# Patient Record
Sex: MALE | Race: WHITE | NOT HISPANIC OR LATINO | URBAN - METROPOLITAN AREA
[De-identification: names, ages, dates, MRNs, and addresses within clinical notes are randomized per-mention and may not be internally consistent; named-entity substitution may affect disease eponyms.]

---

## 2017-12-21 ENCOUNTER — EMERGENCY (EMERGENCY)
Facility: HOSPITAL | Age: 52
LOS: 1 days | Discharge: HOME | End: 2017-12-21
Attending: EMERGENCY MEDICINE | Admitting: EMERGENCY MEDICINE
Payer: MEDICAID

## 2017-12-21 VITALS
RESPIRATION RATE: 15 BRPM | DIASTOLIC BLOOD PRESSURE: 116 MMHG | OXYGEN SATURATION: 100 % | SYSTOLIC BLOOD PRESSURE: 172 MMHG | HEART RATE: 71 BPM

## 2017-12-21 VITALS
HEART RATE: 70 BPM | DIASTOLIC BLOOD PRESSURE: 95 MMHG | TEMPERATURE: 98 F | SYSTOLIC BLOOD PRESSURE: 143 MMHG | RESPIRATION RATE: 16 BRPM | OXYGEN SATURATION: 100 %

## 2017-12-21 PROCEDURE — 99284 EMERGENCY DEPT VISIT MOD MDM: CPT | Mod: 25

## 2017-12-21 PROCEDURE — 93010 ELECTROCARDIOGRAM REPORT: CPT

## 2017-12-21 RX ORDER — LISINOPRIL 2.5 MG/1
1 TABLET ORAL
Qty: 14 | Refills: 0 | OUTPATIENT
Start: 2017-12-21 | End: 2018-01-03

## 2017-12-21 RX ORDER — LISINOPRIL 2.5 MG/1
5 TABLET ORAL DAILY
Qty: 0 | Refills: 0 | Status: DISCONTINUED | OUTPATIENT
Start: 2017-12-21 | End: 2017-12-25

## 2017-12-21 RX ADMIN — LISINOPRIL 5 MILLIGRAM(S): 2.5 TABLET ORAL at 22:02

## 2017-12-21 NOTE — ED ADULT NURSE NOTE - OBJECTIVE STATEMENT
pt received to room 7 pt is A&0x3 pt comes to ED for HTN. pt went to [facility] today and checked his BP and it was 200. pt became nervous and decided to come to the ER. pt states he been stressed lately and recently been started on BP meds. pt VS stable otherwise pt denies CP SOB NVD. meds ordered and given. As per Md no bloodwork needed at this time. awaiting further orders Will continue to monitor the pt

## 2017-12-21 NOTE — ED ADULT TRIAGE NOTE - CHIEF COMPLAINT QUOTE
Pt reports he went to [facility] and his SBP was 200.  Pt states was given a medication by his doctor but it gave him a headache and made his mouth dry; pt was told to stop medication.  Pt denies pain.

## 2017-12-21 NOTE — ED PROVIDER NOTE - NEUROLOGICAL, MLM
Alert and oriented, no focal deficits, no motor or sensory deficits. Gait steady. CN II-XII intact. Motor and strength intact in BUE and BLE.

## 2017-12-21 NOTE — ED PROVIDER NOTE - OBJECTIVE STATEMENT
52M PMH recently diagnosed HTN presents with systolic 200s taken in [facility]. Patient was feeling very stressed at the end of his work day (he works in the meat and restaurant business) and felt his face grow hot and red so he went to [facility] to check his BP. When they saw systolic 200s, they sent him to the ED. He denies any current headaches, visual changes, blurred vision, dizziness, lightheadedness, chest pain, shortness of breath, palpitations. When he was dx with HTN he was put on a low dose HCTZ. When he went back to the PMD, there weren't many changes to the BP so he switched him to another medication that was giving him early morning HAs and dry mouth. He does not know the name. Pt was supposed to f/u with cardiology but has not had time to go. Patient occasionally smokes cigarettes. Patient denies any complaints at the moment.

## 2017-12-21 NOTE — ED PROVIDER NOTE - MEDICAL DECISION MAKING DETAILS
52M PMH HTN presents with uncontrolled HTN. Not concerned for ACS at this time. Will administer lisinopril and check EKG; send pt home with Lisinopril. F/u with PMD and cardiologist.

## 2017-12-21 NOTE — ED PROVIDER NOTE - PLAN OF CARE
You were seen today for high blood pressure. Please take the medication we sent to your pharmacy and try to see your primary care doctor in the next 3 days. it is also important that you see your cardiologist for that appointment you made to check how your heart is doing.  Please try to take it easy even in the busy season.  If you start to experience chest pain, especially when exerting yourself, shortness of breath, blurry vision, or for any other emergent concern please come back to the emergency room.

## 2017-12-21 NOTE — ED ADULT NURSE NOTE - CHIEF COMPLAINT QUOTE
Pt reports [facility] and his SBP was 200.  Pt states was given a medication by his doctor but it gave him a headache and made his mouth dry; pt was told to stop medication.  Pt denies pain.

## 2017-12-21 NOTE — ED PROVIDER NOTE - ATTENDING CONTRIBUTION TO CARE
I agree with the above H&P.  Briefly this is a 62 presenting with asymptomatic hypertension.  check bp today after feeling stressed and it was elevated.  cam to ED, now in 170's.  will check EKG, give lisinopril and dc to PMD

## 2017-12-21 NOTE — ED PROVIDER NOTE - PROGRESS NOTE DETAILS
Ismael: Patient feeling well. Repeat /105. Will d/c to f/u with PMD with 2 week worth of lisinopril.

## 2017-12-21 NOTE — ED PROVIDER NOTE - CARE PLAN
Principal Discharge DX:	Hypertension, unspecified type  Instructions for follow-up, activity and diet:	You were seen today for high blood pressure. Please take the medication we sent to your pharmacy and try to see your primary care doctor in the next 3 days. it is also important that you see your cardiologist for that appointment you made to check how your heart is doing.  Please try to take it easy even in the busy season.  If you start to experience chest pain, especially when exerting yourself, shortness of breath, blurry vision, or for any other emergent concern please come back to the emergency room.

## 2017-12-21 NOTE — ED PROVIDER NOTE - CONSTITUTIONAL, MLM
normal... Well appearing, well nourished, awake, alert, oriented to person, place, time/situation. Patient appeas slightly anxious.

## 2023-04-26 ENCOUNTER — EMERGENCY (EMERGENCY)
Facility: HOSPITAL | Age: 58
LOS: 1 days | Discharge: HOME | End: 2023-04-26
Attending: STUDENT IN AN ORGANIZED HEALTH CARE EDUCATION/TRAINING PROGRAM | Admitting: STUDENT IN AN ORGANIZED HEALTH CARE EDUCATION/TRAINING PROGRAM
Payer: MEDICAID

## 2023-04-26 VITALS
SYSTOLIC BLOOD PRESSURE: 162 MMHG | HEART RATE: 66 BPM | DIASTOLIC BLOOD PRESSURE: 99 MMHG | TEMPERATURE: 98 F | OXYGEN SATURATION: 100 % | RESPIRATION RATE: 20 BRPM

## 2023-04-26 VITALS
DIASTOLIC BLOOD PRESSURE: 114 MMHG | HEART RATE: 65 BPM | RESPIRATION RATE: 17 BRPM | OXYGEN SATURATION: 100 % | TEMPERATURE: 98 F | SYSTOLIC BLOOD PRESSURE: 164 MMHG

## 2023-04-26 PROCEDURE — 99284 EMERGENCY DEPT VISIT MOD MDM: CPT

## 2023-04-26 PROCEDURE — 93010 ELECTROCARDIOGRAM REPORT: CPT

## 2023-04-26 NOTE — ED PROVIDER NOTE - CLINICAL SUMMARY MEDICAL DECISION MAKING FREE TEXT BOX
Venancio PGY2: 59yo M with PMH of HTN presents to ED for eval of elevated BP reads at home after restarting BP meds today. Concern with mild HA already resolving, no other assoc htn sxs. No changes to calf, Wells 0. Likely MSK strain, ambulated into ED. EKG NSR w/o acute ischemic changes. Likely known HTN. Has PCP, likely dc to f/u.

## 2023-04-26 NOTE — ED PROVIDER NOTE - PHYSICAL EXAMINATION
CONSTITUTIONAL: well appearing, comfortable  SKIN: Warm dry  HEAD: NCAT  CARD: RRR  RESP: CTAB  ABD: S/NT no R/G  EXT: no pedal edema, no calf asymmetry/redness/warmth or tenderness  NEURO: Grossly non-focal, no weakness/numbness/tingling  PSYCH: Cooperative, appropriate.

## 2023-04-26 NOTE — ED PROVIDER NOTE - OBJECTIVE STATEMENT
57yo M with PMH of HTN presents to ED for eval of elevated BP reads at home. Patient was previously on amlodipine 5mg but stopped over last mo bc he felt better and BP was better. However recently bp reads again high so went to PCP today who restarted his amlodipine and did full set of blood work. After returning home he re-checked his BP and it was 150s/100s so became anxious and came to ED. He had a mild CHAPA at time which also concerned him but he admits it has also improved to ~3/10. He does mention some R calf pain 3-4d which had steadily improved after some lifting some heavy cylinders at work. No hx of blood clots, smoking, hormones, or travel. Denies CP, SOB, abd pain, NVDC, fever or neuro complaints.

## 2023-04-26 NOTE — ED PROVIDER NOTE - ATTENDING CONTRIBUTION TO CARE
Dr. Culver, Attending Physician-  I performed a face to face bedside interview with patient regarding history of present illness, review of symptoms and past medical history. I completed an independent physical exam.  I have discussed patient's plan of care with the resident.    58M, HTN, who presents with elevated BP. Has been off his amlodipine 5mg for the past one month. Went to PCP recently and was noted again to have a high BP. Otherwise asymptomatic. On ROS, denies headaches, fevers, chills, cough, sputum, cp, sob, abdominal pain, nvd, dysuria, hematuria, recent travel, trauma, syncope, black/bloody stools. Has a headache from time to time but here in the ED without acute sxs. Physical: afebrile, well appearing, neck supple, rrr, ctabl, abdomen soft, no le edema, stable gait, dia spontaneously. Plan: DC with PMD followup.

## 2023-04-26 NOTE — ED ADULT NURSE NOTE - OBJECTIVE STATEMENT
Patient received in spot 10A. Patient A&Ox4 and ambulatory at baseline. Patient presents to the ED c/o high blood pressure at home. Patient PMH HTN and is non-compliant with medication, did not provided reason. Patient states he checked his BP at home and it was high, patient adds he has been having calf pain recently too and he was concerned so he came to the ED for evaluation. Patient denies lightheadedness, blurred vision, and headache. Airway patent, chest rise equal bilaterally, lung sounds clear and equal bilaterally, respirations unlabored. Patient denies dyspnea, shortness of breath, and chest pain. Abdomen flat, soft and non-distended; patient denies nausea/vomiting and changes in BMs/urine. Pulse/motor/sensory present and no edema noted to all four extremities. Steady gait observed, safety measures in place.

## 2023-04-26 NOTE — ED PROVIDER NOTE - IV ALTEPLASE EXCL ABS HIDDEN
Reason for Call:  Other call back    Detailed comments: Melody called to say she was to give the office a phone report as to how she is doing with pain management.    Phone Number Patient can be reached at: Cell number on file:    Telephone Information:   Mobile [phone]       Best Time: any    Can we leave a detailed message on this number? YES    Call taken on 4/13/2020 at 11:49 AM by Mary Rodriguez     show

## 2023-04-26 NOTE — ED PROVIDER NOTE - NSFOLLOWUPINSTRUCTIONS_ED_ALL_ED_FT
Hypertension    Hypertension, commonly called high blood pressure, is when the force of blood pumping through your arteries is too strong. Hypertension forces your heart to work harder to pump blood. Your arteries may become narrow or stiff. Having untreated or uncontrolled hypertension for a long period of time can cause heart attack, stroke, kidney disease, and other problems. If started on a medication, take exactly as prescribed by your health care professional.     Maintain a healthy lifestyle and follow up with your primary care physician on discharge for follow-up and medication adjustment as appropriate.     SEEK IMMEDIATE MEDICAL CARE IF YOU HAVE ANY OF THE FOLLOWING SYMPTOMS: severe headache, confusion, chest pain, abdominal pain, vomiting, or shortness of breath.

## 2023-04-26 NOTE — ED PROVIDER NOTE - PATIENT PORTAL LINK FT
You can access the FollowMyHealth Patient Portal offered by [facility] by registering at the following website: [email or web address]. By joining ’s FollowMyHealth portal, you will also be able to view your health information using other applications (apps) compatible with our system.

## 2023-04-26 NOTE — ED ADULT TRIAGE NOTE - CHIEF COMPLAINT QUOTE
Pt c/o high blood pressure with associated mild headache and right sided calf pain with mild swelling. Pt bp reading at home 159/102. Pt endorsing taking bp meds as prescribed. Denies cp

## 2025-05-03 ENCOUNTER — EMERGENCY (EMERGENCY)
Facility: HOSPITAL | Age: 60
LOS: 1 days | End: 2025-05-03
Attending: STUDENT IN AN ORGANIZED HEALTH CARE EDUCATION/TRAINING PROGRAM | Admitting: STUDENT IN AN ORGANIZED HEALTH CARE EDUCATION/TRAINING PROGRAM
Payer: MEDICAID

## 2025-05-03 VITALS
HEIGHT: 68 IN | WEIGHT: 179.9 LBS | SYSTOLIC BLOOD PRESSURE: 147 MMHG | HEART RATE: 110 BPM | DIASTOLIC BLOOD PRESSURE: 90 MMHG | OXYGEN SATURATION: 95 % | TEMPERATURE: 100 F | RESPIRATION RATE: 16 BRPM

## 2025-05-03 PROCEDURE — 99284 EMERGENCY DEPT VISIT MOD MDM: CPT | Mod: 25

## 2025-05-03 NOTE — ED ADULT TRIAGE NOTE - CCCP TRG CHIEF CMPLNT
June 14, 2018      James Smiley MD  [address]  [facility] [address]           [facility]  [id number] [place] [id number]  Phone: [phone]  Fax: [phone]          Patient: Anu Blanco   MR Number: [id number]   YOB: 1938   Date of Visit: 6/12/2018       Dear Dr. James Smiley:    Thank you for referring Anu Blanco to me for evaluation. Attached you will find relevant portions of my assessment and plan of care.    If you have questions, please do not hesitate to call me. I look forward to following Anu Blanco along with you.    Sincerely,    Rohini Sherman NP    Enclosure  CC:  No Recipients    If you would like to receive this communication electronically, please contact [email or web address] or [phone] to request more information on [facility] access.    For providers and/or their staff who would like to refer a patient to [facility], please contact us through our one-stop-shop provider referral line, [facility], at [phone].    If you feel you have received this communication in error or would no longer like to receive these types of communications, please e-mail [email or web address]          decreased PO intake/nausea

## 2025-05-03 NOTE — ED ADULT TRIAGE NOTE - CHIEF COMPLAINT QUOTE
c/o generalized weakness, decreased po intake since yesterday. endorses chills and nausea, no vomiting. denies abd pain, falls/trauma, gu sx, cp, sob. past medical hx HTN.

## 2025-05-04 VITALS
SYSTOLIC BLOOD PRESSURE: 105 MMHG | RESPIRATION RATE: 18 BRPM | OXYGEN SATURATION: 98 % | TEMPERATURE: 100 F | DIASTOLIC BLOOD PRESSURE: 61 MMHG | HEART RATE: 81 BPM

## 2025-05-04 PROBLEM — I10 ESSENTIAL (PRIMARY) HYPERTENSION: Chronic | Status: ACTIVE | Noted: 2023-04-26

## 2025-05-04 LAB
ADD ON TEST-SPECIMEN IN LAB: SIGNIFICANT CHANGE UP
ALBUMIN SERPL ELPH-MCNC: 4.4 G/DL — SIGNIFICANT CHANGE UP (ref 3.3–5)
ALP SERPL-CCNC: 62 U/L — SIGNIFICANT CHANGE UP (ref 40–120)
ALT FLD-CCNC: 26 U/L — SIGNIFICANT CHANGE UP (ref 4–41)
ANION GAP SERPL CALC-SCNC: 14 MMOL/L — SIGNIFICANT CHANGE UP (ref 7–14)
APPEARANCE UR: CLEAR — SIGNIFICANT CHANGE UP
AST SERPL-CCNC: 24 U/L — SIGNIFICANT CHANGE UP (ref 4–40)
BACTERIA # UR AUTO: NEGATIVE /HPF — SIGNIFICANT CHANGE UP
BASOPHILS # BLD AUTO: 0 K/UL — SIGNIFICANT CHANGE UP (ref 0–0.2)
BASOPHILS NFR BLD AUTO: 0 % — SIGNIFICANT CHANGE UP (ref 0–2)
BILIRUB SERPL-MCNC: 1.6 MG/DL — HIGH (ref 0.2–1.2)
BILIRUB UR-MCNC: NEGATIVE — SIGNIFICANT CHANGE UP
BUN SERPL-MCNC: 11 MG/DL — SIGNIFICANT CHANGE UP (ref 7–23)
CALCIUM SERPL-MCNC: 8.9 MG/DL — SIGNIFICANT CHANGE UP (ref 8.4–10.5)
CAST: 0 /LPF — SIGNIFICANT CHANGE UP (ref 0–4)
CHLORIDE SERPL-SCNC: 98 MMOL/L — SIGNIFICANT CHANGE UP (ref 98–107)
CO2 SERPL-SCNC: 22 MMOL/L — SIGNIFICANT CHANGE UP (ref 22–31)
COLOR SPEC: YELLOW — SIGNIFICANT CHANGE UP
CREAT SERPL-MCNC: 1 MG/DL — SIGNIFICANT CHANGE UP (ref 0.5–1.3)
DIFF PNL FLD: NEGATIVE — SIGNIFICANT CHANGE UP
EGFR: 86 ML/MIN/1.73M2 — SIGNIFICANT CHANGE UP
EGFR: 86 ML/MIN/1.73M2 — SIGNIFICANT CHANGE UP
EOSINOPHIL # BLD AUTO: 0 K/UL — SIGNIFICANT CHANGE UP (ref 0–0.5)
EOSINOPHIL NFR BLD AUTO: 0 % — SIGNIFICANT CHANGE UP (ref 0–6)
FLUAV AG NPH QL: SIGNIFICANT CHANGE UP
FLUBV AG NPH QL: SIGNIFICANT CHANGE UP
GAS PNL BLDV: SIGNIFICANT CHANGE UP
GLUCOSE SERPL-MCNC: 112 MG/DL — HIGH (ref 70–99)
GLUCOSE UR QL: NEGATIVE MG/DL — SIGNIFICANT CHANGE UP
HCT VFR BLD CALC: 45.8 % — SIGNIFICANT CHANGE UP (ref 39–50)
HGB BLD-MCNC: 16.1 G/DL — SIGNIFICANT CHANGE UP (ref 13–17)
IANC: 12.77 K/UL — HIGH (ref 1.8–7.4)
KETONES UR-MCNC: NEGATIVE MG/DL — SIGNIFICANT CHANGE UP
LEUKOCYTE ESTERASE UR-ACNC: NEGATIVE — SIGNIFICANT CHANGE UP
LYMPHOCYTES # BLD AUTO: 0.4 K/UL — LOW (ref 1–3.3)
LYMPHOCYTES # BLD AUTO: 2.8 % — LOW (ref 13–44)
MAGNESIUM SERPL-MCNC: 1.6 MG/DL — SIGNIFICANT CHANGE UP (ref 1.6–2.6)
MCHC RBC-ENTMCNC: 30.2 PG — SIGNIFICANT CHANGE UP (ref 27–34)
MCHC RBC-ENTMCNC: 35.2 G/DL — SIGNIFICANT CHANGE UP (ref 32–36)
MCV RBC AUTO: 85.9 FL — SIGNIFICANT CHANGE UP (ref 80–100)
MONOCYTES # BLD AUTO: 0.27 K/UL — SIGNIFICANT CHANGE UP (ref 0–0.9)
MONOCYTES NFR BLD AUTO: 1.9 % — LOW (ref 2–14)
NEUTROPHILS # BLD AUTO: 13.07 K/UL — HIGH (ref 1.8–7.4)
NEUTROPHILS NFR BLD AUTO: 91.6 % — HIGH (ref 43–77)
NITRITE UR-MCNC: NEGATIVE — SIGNIFICANT CHANGE UP
PH UR: 6 — SIGNIFICANT CHANGE UP (ref 5–8)
PLATELET # BLD AUTO: 225 K/UL — SIGNIFICANT CHANGE UP (ref 150–400)
POTASSIUM SERPL-MCNC: 3.6 MMOL/L — SIGNIFICANT CHANGE UP (ref 3.5–5.3)
POTASSIUM SERPL-SCNC: 3.6 MMOL/L — SIGNIFICANT CHANGE UP (ref 3.5–5.3)
PROT SERPL-MCNC: 6.8 G/DL — SIGNIFICANT CHANGE UP (ref 6–8.3)
PROT UR-MCNC: 100 MG/DL
RBC # BLD: 5.33 M/UL — SIGNIFICANT CHANGE UP (ref 4.2–5.8)
RBC # FLD: 12.7 % — SIGNIFICANT CHANGE UP (ref 10.3–14.5)
RBC CASTS # UR COMP ASSIST: 0 /HPF — SIGNIFICANT CHANGE UP (ref 0–4)
RSV RNA NPH QL NAA+NON-PROBE: SIGNIFICANT CHANGE UP
SARS-COV-2 RNA SPEC QL NAA+PROBE: SIGNIFICANT CHANGE UP
SODIUM SERPL-SCNC: 134 MMOL/L — LOW (ref 135–145)
SOURCE RESPIRATORY: SIGNIFICANT CHANGE UP
SP GR SPEC: 1.02 — SIGNIFICANT CHANGE UP (ref 1–1.03)
SQUAMOUS # UR AUTO: 1 /HPF — SIGNIFICANT CHANGE UP (ref 0–5)
UROBILINOGEN FLD QL: 0.2 MG/DL — SIGNIFICANT CHANGE UP (ref 0.2–1)
WBC # BLD: 14.27 K/UL — HIGH (ref 3.8–10.5)
WBC # FLD AUTO: 14.27 K/UL — HIGH (ref 3.8–10.5)
WBC UR QL: 1 /HPF — SIGNIFICANT CHANGE UP (ref 0–5)

## 2025-05-04 PROCEDURE — 70450 CT HEAD/BRAIN W/O DYE: CPT | Mod: 26

## 2025-05-04 PROCEDURE — 71046 X-RAY EXAM CHEST 2 VIEWS: CPT | Mod: 26

## 2025-05-04 RX ORDER — ACETAMINOPHEN 500 MG/5ML
1000 LIQUID (ML) ORAL ONCE
Refills: 0 | Status: COMPLETED | OUTPATIENT
Start: 2025-05-04 | End: 2025-05-04

## 2025-05-04 RX ADMIN — Medication 1000 MILLILITER(S): at 00:57

## 2025-05-04 RX ADMIN — Medication 400 MILLIGRAM(S): at 00:57

## 2025-05-04 RX ADMIN — Medication 1000 MILLILITER(S): at 03:37

## 2025-05-04 NOTE — ED PROVIDER NOTE - CARE PLAN
no abdominal pain, no bloating, no constipation, no diarrhea, no nausea and no vomiting. Principal Discharge DX:	Fever   1

## 2025-05-04 NOTE — ED PROVIDER NOTE - ATTENDING CONTRIBUTION TO CARE
59 yo M hx HTN, presenting with complaints of malaise, HA, nausea & vomiting and abdominal pain. Headache was gradual onset, worsened this afternoon. Had an episode tonight when he felt dizzy/off balanced, and episode resolved on its own. Pt neuro intact, non-toxic appearing. Abd soft nt/nd. Motor and sensation intact. No meningismus on exam. Plan for sepsis labs, CTH, and reassess.

## 2025-05-04 NOTE — ED PROVIDER NOTE - OBJECTIVE STATEMENT
60-year-old male PMH HTN, presenting with malaise.  Patient reports 2 days of increasing fatigue, generalized weakness, nausea and vague abdominal discomfort, as well as headache.  Patient describes gradual onset headache that started around 4 PM this afternoon and has been progressively worsening. Also with difficulty walking/imbalance which has never happened to him before. No medications attempted prior to arrival.  Denies headache history.  No trauma.  Denies sick contacts.  He notes that he has been stressed at work and not sleeping as much recently, wonders if this could be contributing to his symptoms.

## 2025-05-04 NOTE — ED PROVIDER NOTE - PHYSICAL EXAMINATION
GENERAL: Awake, alert, appears fatigued/ill but mentating appropriately  HEAD: NC/AT, neck supple, moist mucous membranes  EYES: EOM grossly intact, sclera anicteric  LUNGS: normal WOB on RA, CTAB, no wheezes or crackles   CARDIAC: RRR, no m/r/g  ABDOMEN: Soft, non tender, non distended, no rebound, no guarding  BACK: No midline spinal tenderness, no CVA tenderness  EXT: No edema, no calf tenderness, no deformities.  NEURO: A&Ox3. Moving all extremities without focal deficit. CN grossly intact bilaterally. 5/5 strength and normal sensation throughout all four extremities. Normal FNF. No pronator drift. no facial asymmetry.    SKIN: Warm and dry. No rash.  PSYCH: Normal affect.

## 2025-05-04 NOTE — ED PROVIDER NOTE - PROGRESS NOTE DETAILS
Melva Sterling MD PGY-3: patient reassessed, feeling much better. tolerating PO. no actionable findings on labs, imaging. mild leukocytosis noted, cultures sent. Plan for DC at this time. return and f/u precautions discussed and patient dc home in stable condition.

## 2025-05-04 NOTE — ED PROVIDER NOTE - NSFOLLOWUPINSTRUCTIONS_ED_ALL_ED_FT
You were seen in the ER today     The results of all of the testing performed today are included in this paperwork.    Continue all of your medications as currently prescribed.     For pain, you may take:  1) Acetaminophen (Tylenol): 650mg every 6 hours or as needed for pain  2) Ibuprofen (Advil/Motrin): 600mg every 6 hours or as needed for pain     Follow up with your primary doctor in the next 2-3 days to be re-evaluated. Bring this paperwork with you to any follow-up appointments to discuss the results of any testing performed at today's visit.    Return to the ER if you develop any new or worsening symptoms including chest pain, difficulty breathing, or if you are otherwise concerned.

## 2025-05-04 NOTE — ED PROVIDER NOTE - PATIENT PORTAL LINK FT
You can access the FollowMyHealth Patient Portal offered by [facility] by registering at the following website: [email or web address]. By joining [employer]’s FollowMyHealth portal, you will also be able to view your health information using other applications (apps) compatible with our system.

## 2025-05-04 NOTE — ED PROVIDER NOTE - CLINICAL SUMMARY MEDICAL DECISION MAKING FREE TEXT BOX
60-year-old male history of hypertension presenting with 2 days of vague complaints including generalized malaise/weakness, fatigue, and gait imbalance associated with progressively worsening headache starting at 4 PM today.  On initial evaluation, patient appears fatigued and ill but is mentating appropriately.  No focal neurologic deficits.  No meningismus.  VS notable for oral temp 100.4 F, otherwise nonactionable vital signs.  Suspect underlying viral syndrome however given report of headache with gait imbalance will obtain CT head to rule out occult intracranial process; screening labs, viral swab, chest x-ray, fluid bolus and Ofirmev for symptom control, reassess to dispo.

## 2025-05-04 NOTE — ED ADULT NURSE NOTE - OBJECTIVE STATEMENT
arcelia rn. pt received to spot 8a A&ox4, ambulatory at baseline. history of HTN. pt c/o x2 days of headache, intermittent periods of lightheadedness, generalized weakness, nasal congestion and decreased PO intake. pt denies recent travel/known sick contacts. denies chest pain, SOB, dizziness, syncopal episodes, nausea, vomiting, diarrhea, numbness/tingling to hands/feet, blurry vision. pt well appearing. 20g placed to L forearm, labs collected and sent, viral swab obtained. IVF hung and pt medicated for symptoms .respirations even unlabored, abdomen soft, denies urinary symptoms, pedal pulses 2+ bilaterally. report given to primary RN amria d. safety maintained

## 2025-05-04 NOTE — ED ADULT NURSE REASSESSMENT NOTE - NS ED NURSE REASSESS COMMENT FT1
Pt awake and alert, A&OX4, respirations even and unlabored. Denies CP, SOB, HA, dizziness, palpitations, blurry vision. Resting comfortably. Fluids administered per orders. Pt endorses "feeling much better." No acute distress noted. Awaiting imaging.

## 2025-05-05 LAB
CULTURE RESULTS: SIGNIFICANT CHANGE UP
SPECIMEN SOURCE: SIGNIFICANT CHANGE UP

## 2025-05-06 ENCOUNTER — EMERGENCY (EMERGENCY)
Facility: HOSPITAL | Age: 60
LOS: 1 days | End: 2025-05-06
Attending: EMERGENCY MEDICINE | Admitting: EMERGENCY MEDICINE
Payer: MEDICAID

## 2025-05-06 VITALS
WEIGHT: 190.04 LBS | TEMPERATURE: 98 F | OXYGEN SATURATION: 97 % | HEART RATE: 70 BPM | DIASTOLIC BLOOD PRESSURE: 90 MMHG | RESPIRATION RATE: 18 BRPM | SYSTOLIC BLOOD PRESSURE: 151 MMHG | HEIGHT: 68 IN

## 2025-05-06 LAB
-  BLOOD PCR PANEL: SIGNIFICANT CHANGE UP
ALBUMIN SERPL ELPH-MCNC: 4.1 G/DL — SIGNIFICANT CHANGE UP (ref 3.3–5)
ALP SERPL-CCNC: 65 U/L — SIGNIFICANT CHANGE UP (ref 40–120)
ALT FLD-CCNC: 27 U/L — SIGNIFICANT CHANGE UP (ref 4–41)
ANION GAP SERPL CALC-SCNC: 12 MMOL/L — SIGNIFICANT CHANGE UP (ref 7–14)
AST SERPL-CCNC: 33 U/L — SIGNIFICANT CHANGE UP (ref 4–40)
BASOPHILS # BLD AUTO: 0.04 K/UL — SIGNIFICANT CHANGE UP (ref 0–0.2)
BASOPHILS NFR BLD AUTO: 0.6 % — SIGNIFICANT CHANGE UP (ref 0–2)
BILIRUB SERPL-MCNC: 0.8 MG/DL — SIGNIFICANT CHANGE UP (ref 0.2–1.2)
BLOOD GAS VENOUS COMPREHENSIVE RESULT: SIGNIFICANT CHANGE UP
BUN SERPL-MCNC: 14 MG/DL — SIGNIFICANT CHANGE UP (ref 7–23)
CALCIUM SERPL-MCNC: 9.6 MG/DL — SIGNIFICANT CHANGE UP (ref 8.4–10.5)
CHLORIDE SERPL-SCNC: 101 MMOL/L — SIGNIFICANT CHANGE UP (ref 98–107)
CO2 SERPL-SCNC: 24 MMOL/L — SIGNIFICANT CHANGE UP (ref 22–31)
CREAT SERPL-MCNC: 0.85 MG/DL — SIGNIFICANT CHANGE UP (ref 0.5–1.3)
EGFR: 99 ML/MIN/1.73M2 — SIGNIFICANT CHANGE UP
EGFR: 99 ML/MIN/1.73M2 — SIGNIFICANT CHANGE UP
EOSINOPHIL # BLD AUTO: 0.23 K/UL — SIGNIFICANT CHANGE UP (ref 0–0.5)
EOSINOPHIL NFR BLD AUTO: 3.3 % — SIGNIFICANT CHANGE UP (ref 0–6)
GLUCOSE SERPL-MCNC: 91 MG/DL — SIGNIFICANT CHANGE UP (ref 70–99)
GRAM STN FLD: ABNORMAL
HCT VFR BLD CALC: 45.3 % — SIGNIFICANT CHANGE UP (ref 39–50)
HGB BLD-MCNC: 15.8 G/DL — SIGNIFICANT CHANGE UP (ref 13–17)
IANC: 4.06 K/UL — SIGNIFICANT CHANGE UP (ref 1.8–7.4)
IMM GRANULOCYTES NFR BLD AUTO: 0.3 % — SIGNIFICANT CHANGE UP (ref 0–0.9)
LYMPHOCYTES # BLD AUTO: 1.68 K/UL — SIGNIFICANT CHANGE UP (ref 1–3.3)
LYMPHOCYTES # BLD AUTO: 24 % — SIGNIFICANT CHANGE UP (ref 13–44)
MCHC RBC-ENTMCNC: 29.9 PG — SIGNIFICANT CHANGE UP (ref 27–34)
MCHC RBC-ENTMCNC: 34.9 G/DL — SIGNIFICANT CHANGE UP (ref 32–36)
MCV RBC AUTO: 85.8 FL — SIGNIFICANT CHANGE UP (ref 80–100)
METHOD TYPE: SIGNIFICANT CHANGE UP
MONOCYTES # BLD AUTO: 0.97 K/UL — HIGH (ref 0–0.9)
MONOCYTES NFR BLD AUTO: 13.9 % — SIGNIFICANT CHANGE UP (ref 2–14)
NEUTROPHILS # BLD AUTO: 4.06 K/UL — SIGNIFICANT CHANGE UP (ref 1.8–7.4)
NEUTROPHILS NFR BLD AUTO: 57.9 % — SIGNIFICANT CHANGE UP (ref 43–77)
NRBC # BLD AUTO: 0 K/UL — SIGNIFICANT CHANGE UP (ref 0–0)
NRBC # FLD: 0 K/UL — SIGNIFICANT CHANGE UP (ref 0–0)
NRBC BLD AUTO-RTO: 0 /100 WBCS — SIGNIFICANT CHANGE UP (ref 0–0)
PLATELET # BLD AUTO: 260 K/UL — SIGNIFICANT CHANGE UP (ref 150–400)
POTASSIUM SERPL-MCNC: 4 MMOL/L — SIGNIFICANT CHANGE UP (ref 3.5–5.3)
POTASSIUM SERPL-SCNC: 4 MMOL/L — SIGNIFICANT CHANGE UP (ref 3.5–5.3)
PROT SERPL-MCNC: 7.4 G/DL — SIGNIFICANT CHANGE UP (ref 6–8.3)
RBC # BLD: 5.28 M/UL — SIGNIFICANT CHANGE UP (ref 4.2–5.8)
RBC # FLD: 12.3 % — SIGNIFICANT CHANGE UP (ref 10.3–14.5)
SODIUM SERPL-SCNC: 137 MMOL/L — SIGNIFICANT CHANGE UP (ref 135–145)
WBC # BLD: 7 K/UL — SIGNIFICANT CHANGE UP (ref 3.8–10.5)
WBC # FLD AUTO: 7 K/UL — SIGNIFICANT CHANGE UP (ref 3.8–10.5)

## 2025-05-06 PROCEDURE — 99285 EMERGENCY DEPT VISIT HI MDM: CPT

## 2025-05-06 RX ORDER — CEFEPIME 2 G/20ML
2000 INJECTION, POWDER, FOR SOLUTION INTRAVENOUS ONCE
Refills: 0 | Status: COMPLETED | OUTPATIENT
Start: 2025-05-06 | End: 2025-05-06

## 2025-05-06 RX ADMIN — CEFEPIME 100 MILLIGRAM(S): 2 INJECTION, POWDER, FOR SOLUTION INTRAVENOUS at 22:40

## 2025-05-06 RX ADMIN — Medication 1000 MILLILITER(S): at 22:40

## 2025-05-06 NOTE — ED ADULT NURSE NOTE - NSFALLUNIVINTERV_ED_ALL_ED
Bed/Stretcher in lowest position, wheels locked, appropriate side rails in place/Call bell, personal items and telephone in reach/Instruct patient to call for assistance before getting out of bed/chair/stretcher/Non-slip footwear applied when patient is off stretcher/[place] to call system/Physically safe environment - no spills, clutter or unnecessary equipment/Purposeful proactive rounding/Room/bathroom lighting operational, light cord in reach

## 2025-05-06 NOTE — ED PROVIDER NOTE - CLINICAL SUMMARY MEDICAL DECISION MAKING FREE TEXT BOX
Will repeat labs including lactate and will redraw blood cultures and provide cefepime 2 g for possible gram-negative sepsis and have the patient admitted with an infectious disease consult for observation and further management.

## 2025-05-06 NOTE — ED ADULT NURSE NOTE - OBJECTIVE STATEMENT
Pt received in intake. A&O4. ambulatory. Came back for positive blood cultures. Seen here on sunday for N/V, headache w/ cultures showing growth. Pt now presents with no N/V/D, fevers, abd pain chest pain. well appearing. no signs of acute distress noted. respirations even and unlabored. no signs of acute distress noted. comfort and safety maintained. call bell within reach.

## 2025-05-06 NOTE — ED PROVIDER NOTE - OBJECTIVE STATEMENT
60-year-old male with hypertension who was seen in the ED on May 4 who had presented with malaise headache nausea vague abdominal pain was called back today with positive blood cultures of gram-negative coccibacilli.  The patient had a head CT at that time, a negative viral panel, a negative urine and chest x-ray.  Today the patient denies fevers, chills, night sweats, chest pain, shortness of breath, nausea, vomiting, diarrhea or weakness.

## 2025-05-06 NOTE — ED ADULT TRIAGE NOTE - CHIEF COMPLAINT QUOTE
pt called back for + blood cultures taken on 5/3. pt endorses night sweats. denies fevers, chest pain, SOB, n/v/d, weakness, Hx

## 2025-05-07 LAB — ADD ON TEST-SPECIMEN IN LAB: SIGNIFICANT CHANGE UP

## 2025-05-07 PROCEDURE — 99222 1ST HOSP IP/OBS MODERATE 55: CPT

## 2025-05-07 PROCEDURE — 99204 OFFICE O/P NEW MOD 45 MIN: CPT | Mod: GC

## 2025-05-07 RX ORDER — CEFEPIME 2 G/20ML
1000 INJECTION, POWDER, FOR SOLUTION INTRAVENOUS EVERY 8 HOURS
Refills: 0 | Status: DISCONTINUED | OUTPATIENT
Start: 2025-05-08 | End: 2025-05-08

## 2025-05-07 RX ORDER — NALTREXONE HYDROCHLORIDE 50 MG/1
1 TABLET, FILM COATED ORAL
Qty: 7 | Refills: 0
Start: 2025-05-07 | End: 2025-05-13

## 2025-05-07 RX ORDER — METFORMIN HYDROCHLORIDE 850 MG/1
1 TABLET ORAL
Qty: 7 | Refills: 0
Start: 2025-05-07 | End: 2025-05-13

## 2025-05-07 RX ORDER — ATORVASTATIN CALCIUM 80 MG/1
1 TABLET, FILM COATED ORAL
Qty: 7 | Refills: 0
Start: 2025-05-07 | End: 2025-05-13

## 2025-05-07 RX ORDER — METRONIDAZOLE 250 MG
500 TABLET ORAL EVERY 12 HOURS
Refills: 0 | Status: DISCONTINUED | OUTPATIENT
Start: 2025-05-08 | End: 2025-05-08

## 2025-05-07 RX ORDER — LEVOTHYROXINE SODIUM 300 MCG
1 TABLET ORAL
Qty: 7 | Refills: 0
Start: 2025-05-07 | End: 2025-05-13

## 2025-05-07 RX ORDER — ACETAMINOPHEN 500 MG/5ML
975 LIQUID (ML) ORAL EVERY 6 HOURS
Refills: 0 | Status: ACTIVE | OUTPATIENT
Start: 2025-05-07 | End: 2026-04-05

## 2025-05-07 RX ORDER — RISPERIDONE 4 MG
1 TABLET ORAL
Qty: 7 | Refills: 0
Start: 2025-05-07 | End: 2025-05-13

## 2025-05-07 RX ORDER — FLUTICASONE PROPIONATE 50 UG/1
2 SPRAY, METERED NASAL
Qty: 1 | Refills: 0
Start: 2025-05-07 | End: 2025-05-13

## 2025-05-07 RX ORDER — CEFEPIME 2 G/20ML
1000 INJECTION, POWDER, FOR SOLUTION INTRAVENOUS ONCE
Refills: 0 | Status: COMPLETED | OUTPATIENT
Start: 2025-05-07 | End: 2025-05-07

## 2025-05-07 RX ORDER — METRONIDAZOLE 250 MG
TABLET ORAL
Refills: 0 | Status: DISCONTINUED | OUTPATIENT
Start: 2025-05-07 | End: 2025-05-08

## 2025-05-07 RX ORDER — CEFEPIME 2 G/20ML
INJECTION, POWDER, FOR SOLUTION INTRAVENOUS
Refills: 0 | Status: DISCONTINUED | OUTPATIENT
Start: 2025-05-07 | End: 2025-05-08

## 2025-05-07 RX ORDER — METRONIDAZOLE 250 MG
500 TABLET ORAL ONCE
Refills: 0 | Status: COMPLETED | OUTPATIENT
Start: 2025-05-07 | End: 2025-05-07

## 2025-05-07 RX ORDER — LORATADINE 5 MG/5ML
1 SOLUTION ORAL
Qty: 7 | Refills: 0
Start: 2025-05-07 | End: 2025-05-13

## 2025-05-07 RX ADMIN — CEFEPIME 100 MILLIGRAM(S): 2 INJECTION, POWDER, FOR SOLUTION INTRAVENOUS at 20:50

## 2025-05-07 RX ADMIN — Medication 100 MILLIGRAM(S): at 19:37

## 2025-05-07 RX ADMIN — Medication 975 MILLIGRAM(S): at 02:47

## 2025-05-07 RX ADMIN — Medication 975 MILLIGRAM(S): at 03:27

## 2025-05-07 NOTE — ED ADULT NURSE REASSESSMENT NOTE - NS ED NURSE REASSESS COMMENT FT1
Report given to CDU RN Mary. Pt at baseline mental status and has no complaints at this time. Respirations even and unlabored.
BREAK RN: Pt resting in CDU 12. Pt denies physical complaints at this time. Airway is patent, respirations are even and unlabored. Plan of care ongoing ,safety maintained.

## 2025-05-07 NOTE — CONSULT NOTE ADULT - ASSESSMENT
Patient is a 60 year old male with PMH of HTN, HLD, recently prescribed a 3 day course of Doxycycline in 3/2025, who presents to the ER for positive blood culture.     Patient initially presented to the ER on 5/4 for his sx. Patiet states 5 days ago he experienced increased fatigue but attributed it to working an 80 hour work week. The next day he then developped worsening fatigue and impaired gait as well as headache so he came to the ER on 5/4. Denies fevers chills but states after recent ER visit,  he developped sweats at night and also had 3 episodes of diarrhea in 1 day, completely loose stools, no blood. Denies abdominal pain, nausea, vomiting, new urinary sx. No recent travel or sick contacts. 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 Also notes his family members own cats and the cats frequently scratch him, last was last week to the hand.     Labs on initial presentation notable for CBC with leukocytosis to 14.27. CMP with TB of 1.6. Full RVP negative. U/A without pyuria and urine culture negative.   CT head: No evidence of acute intracranial pathology. If symptoms persist, MRI of the brain may be considered for further evaluation.  CXR: Clear lungs. Findings unchanged    Patient called back to the ER after 1/4 blood culture bottles from 5/4 resulted with gram negative coccobacilli in anaerobic bottle ( blood pcr negative).     On presentation today:   VSS  Labs: CBC without leukocytosis. Repeat blood cultures in process.     Abx:   -s/p Cefepime x 1 on 5/7    #1/4 blood culture bottles with gram negative coccobacilli (negative blood PCR)   -ddx includes yersinia vs pasteurella vs brucellosis but likely transient bacteremia as patient is now improved prior to abx administration on presentation  -recommend Cefepime 2 g IVPB q8h and Flagyl 500 mg IVPB q12h for now while pending blood culture ID   -monitor bowel movements obtain GI PCR panel  -will wait for blood culture ID prior to obtaining additional imaging   -f/u repeat blood cultures   -monitor WBC and fever curve    Case seen and discussed with Dr. Goodrich who agrees with assessment and plan. Note not final until attending addendum.

## 2025-05-07 NOTE — CONSULT NOTE ADULT - ATTENDING COMMENTS
59 yo man called back to ER for management positive blood culture 5/4 - gram negative coccobacilli in 1/4 bottles   Had extreme fatigue, sweating, HA, diarrhea at that time now resolved w/o antibiotics   ?Transient bacteremia due to gastroenteritis   exposure to kitten scratch ?pasteurella   Would follow blood isolate identification   Send stool GI PCR  Cefepime and flagyl for now

## 2025-05-07 NOTE — ED CDU PROVIDER INITIAL DAY NOTE - OBJECTIVE STATEMENT
ED NOTE: "60-year-old male with hypertension who was seen in the ED on May 4 who had presented with malaise headache nausea vague abdominal pain was called back today with positive blood cultures of gram-negative coccibacilli.  The patient had a head CT at that time, a negative viral panel, a negative urine and chest x-ray.  Today the patient denies fevers, chills, night sweats, chest pain, shortness of breath, nausea, vomiting, diarrhea or weakness."    CDU KEVIN Murray: Agree with above. Pt notes he is feeling better than when he presented 4 days ago but he does have night sweats. Lab analysis in the ER unremarkable. ED team unable to reach ID to discuss case so placed in CDU for ID consult and recommendations.

## 2025-05-07 NOTE — ED CDU PROVIDER INITIAL DAY NOTE - CLINICAL SUMMARY MEDICAL DECISION MAKING FREE TEXT BOX
60-year-old male with hypertension who was seen in the ED on May 4 who had presented with malaise headache nausea vague abdominal pain was called back today with positive blood cultures of gram-negative coccibacilli.  Pt notes he is feeling better than when he presented 4 days ago but he does have night sweats. Lab analysis in the ER unremarkable. ED team unable to reach ID to discuss case so placed in CDU for ID consult and recommendations.

## 2025-05-07 NOTE — CONSULT NOTE ADULT - SUBJECTIVE AND OBJECTIVE BOX
Patient is a 60 year old male with PMH of HTN, HLD, recently prescribed a 3 day course of Doxycyline in 3/2025, who presents to the ER for positive blood culture.     Patient initially presented to the ER on 5/4 for 2 days o malaise, fatigue generalized weakness, nausea, cague abdominal discomfort and headache. Vitals on initial presentation notable for tachycardia. Labs on initial presentation notable for CBC with leukocytosis to 14.27. CMP with TB of 1.6. Full RVP negative. U/A without pyuria and urine culture negative.   CT head: No evidence of acute intracranial pathology. If symptoms persist, MRI of the brain may be considered for further evaluation.  CXR: Clear lungs. Findings unchanged    Patient discharged with supportive care.     Patient called back to the ER after 1/4 blood culture bottles from 5/4 resuled with gram negative coccobacilli ( blood pcr negative).     On presentation today:   VSS  Labs: CBC without leukocytosis. Repeat blood cultures in process.     Abx:   -s/p Cefepime x 1 on 5/7       REVIEW OF SYSTEMS  pending full examination    prior hospital charts reviewed [V]  primary team notes reviewed [V]  other consultant notes reviewed [V]    PAST MEDICAL & SURGICAL HISTORY:  Nephrolithiasis      HTN (hypertension)      No significant past surgical history          SOCIAL HISTORY:  Denied smoking/vaping/alcohol/recreational drug use    FAMILY HISTORY:      Allergies  No Known Allergies        ANTIMICROBIALS:      ANTIMICROBIALS (past 90 days):  MEDICATIONS  (STANDING):  cefepime   IVPB   100 mL/Hr IV Intermittent (05-06-25 @ 22:40)        OTHER MEDS:   MEDICATIONS  (STANDING):  acetaminophen     Tablet .. 975 every 6 hours PRN      VITALS:  Vital Signs Last 24 Hrs  T(F): 97.9 (05-07-25 @ 10:00), Max: 100.4 (05-03-25 @ 23:27)    Vital Signs Last 24 Hrs  HR: 68 (05-07-25 @ 10:00) (60 - 71)  BP: 142/89 (05-07-25 @ 10:00) (118/69 - 152/91)  RR: 16 (05-07-25 @ 10:00)  SpO2: 96% (05-07-25 @ 10:00) (95% - 97%)  Wt(kg): --    EXAM:  pending full examination      Labs:                        15.8   7.00  )-----------( 260      ( 06 May 2025 22:34 )             45.3     05-06    137  |  101  |  14  ----------------------------<  91  4.0   |  24  |  0.85    Ca    9.6      06 May 2025 22:34    TPro  7.4  /  Alb  4.1  /  TBili  0.8  /  DBili  x   /  AST  33  /  ALT  27  /  AlkPhos  65  05-06      WBC Trend:  WBC Count: 7.00 (05-06-25 @ 22:34)  WBC Count: 14.27 (05-04-25 @ 00:59)          Creatine Trend:  Creatinine: 0.85 (05-06)  Creatinine: 1.00 (05-04)      Liver Biochemical Testing Trend:  Alanine Aminotransferase (ALT/SGPT): 27 (05-06)  Alanine Aminotransferase (ALT/SGPT): 26 (05-04)  Aspartate Aminotransferase (AST/SGOT): 33 (05-06-25 @ 22:34)  Aspartate Aminotransferase (AST/SGOT): 24 (05-04-25 @ 00:59)  Bilirubin Total: 0.8 (05-06)  Bilirubin Total: 1.6 (05-04)      Trend LDH          MICROBIOLOGY:        Culture - Urine (collected 04 May 2025 06:01)  Source: Clean Catch Clean Catch (Midstream)  Final Report:    <10,000 CFU/mL Normal Urogenital Elena    Culture - Blood (collected 04 May 2025 02:30)  Source: Blood Blood-Peripheral  Preliminary Report:    No growth at 72 Hours    Culture - Blood (collected 04 May 2025 02:25)  Source: Blood Blood-Peripheral  Preliminary Report:    Growth in anaerobic bottle: Gram Negative Coccobacilli    Direct identification is available within approximately 3-5    hours either by Blood Panel Multiplexed PCR or Direct    MALDI-TOF. Details: [email or web address]  Organism: Blood Culture PCR  Organism: Blood Culture PCR    Sensitivities:      Method Type: PCR      -  Blood PCR Panel: NEG                        Rapid RVP Result: NotDetec (05-04 @ 00:59)                        Blood Gas Venous - Lactate: 1.2 (05-06 @ 22:34)    A1C with Estimated Average Glucose Result: 5.4 % (05-06-25 @ 22:34)      RADIOLOGY:  imaging below personally reviewed   Patient is a 60 year old male with PMH of HTN, HLD, recently prescribed a 3 day course of Doxycyline in 3/2025, who presents to the ER for positive blood culture.     Patient initially presented to the ER on 5/4 for his sx. Patiet states 5 days ago he experienced increased fatigue but attributed it to working an 80 hor work week. 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 The next day he then developped worsening fatigue and impaired gait as well as headache so he came to the ER on 5/4.     Labs on initial presentation notable for CBC with leukocytosis to 14.27. CMP with TB of 1.6. Full RVP negative. U/A without pyuria and urine culture negative.   CT head: No evidence of acute intracranial pathology. If symptoms persist, MRI of the brain may be considered for further evaluation.  CXR: Clear lungs. Findings unchanged    Patient discharged with supportive care.     Patient called back to the ER after 1/4 blood culture bottles from 5/4 resuled with gram negative coccobacilli ( blood pcr negative).     Denies fevers chills but states after discharge he developped sweats at night and also had 3 episodes of diarrhea in 1 day, completely loose stools, no blood. Denies abdominal pain, nausea, vomiting, new urinary sx. No recent travel or sick contacts.     On presentation today:   VSS  Labs: CBC without leukocytosis. Repeat blood cultures in process.     Abx:   -s/p Cefepime x 1 on 5/7       REVIEW OF SYSTEMS  Constitutional: No fevers, No chills, No weight loss, + fatigue   Skin: No rash, no phlebitis	  Eyes: No discharge, No change in vision	  ENMT: No sore throat, No ulcers  Respiratory: No cough, no SOB  Cardiovascular:  No chest pain, No palpitations   Gastrointestinal: No pain, No nausea, No vomiting, + diarrhea, No constipation	  Genitourinary: No dysuria, No frequency, No hesitancy, No flank pain  MSK: No Joint pain, No back pain, No edema  Neurological: + HA, no weakness, no seizures, no AMS     prior hospital charts reviewed [V]  primary team notes reviewed [V]  other consultant notes reviewed [V]    PAST MEDICAL & SURGICAL HISTORY:  Nephrolithiasis      HTN (hypertension)      No significant past surgical history          SOCIAL HISTORY:  Denied smoking/vaping/alcohol/recreational drug use  -[place]   -lives in [place], [place]  -works in restaurant business as above   -no pets at home but he visits family members with cats and dogs     FAMILY HISTORY:  -no family hx of cancer       Allergies  No Known Allergies        ANTIMICROBIALS:      ANTIMICROBIALS (past 90 days):  MEDICATIONS  (STANDING):  cefepime   IVPB   100 mL/Hr IV Intermittent (05-06-25 @ 22:40)        OTHER MEDS:   MEDICATIONS  (STANDING):  acetaminophen     Tablet .. 975 every 6 hours PRN      VITALS:  Vital Signs Last 24 Hrs  T(F): 97.9 (05-07-25 @ 10:00), Max: 100.4 (05-03-25 @ 23:27)    Vital Signs Last 24 Hrs  HR: 68 (05-07-25 @ 10:00) (60 - 71)  BP: 142/89 (05-07-25 @ 10:00) (118/69 - 152/91)  RR: 16 (05-07-25 @ 10:00)  SpO2: 96% (05-07-25 @ 10:00) (95% - 97%)  Wt(kg): --    EXAM:  General: Patient appears comfortable, no acute distress  HEENT: NCAT  CV: +S1/S2, RRR, no M/R/G  Lungs: No respiratory distress, CTA b/l, no wheezing, rales or rhonchi  Abd:  BS4+, Soft, NTND, no guarding  : No suprapubic tenderness  Neuro: AAOx3. No focal deficits noted.   Ext: No cyanosis, no edema  Msk: freely moving upper and lower extremities  Skin: No rash, no phlebitis, No erythema       Labs:                        15.8   7.00  )-----------( 260      ( 06 May 2025 22:34 )             45.3     05-06    137  |  101  |  14  ----------------------------<  91  4.0   |  24  |  0.85    Ca    9.6      06 May 2025 22:34    TPro  7.4  /  Alb  4.1  /  TBili  0.8  /  DBili  x   /  AST  33  /  ALT  27  /  AlkPhos  65  05-06      WBC Trend:  WBC Count: 7.00 (05-06-25 @ 22:34)  WBC Count: 14.27 (05-04-25 @ 00:59)          Creatine Trend:  Creatinine: 0.85 (05-06)  Creatinine: 1.00 (05-04)      Liver Biochemical Testing Trend:  Alanine Aminotransferase (ALT/SGPT): 27 (05-06)  Alanine Aminotransferase (ALT/SGPT): 26 (05-04)  Aspartate Aminotransferase (AST/SGOT): 33 (05-06-25 @ 22:34)  Aspartate Aminotransferase (AST/SGOT): 24 (05-04-25 @ 00:59)  Bilirubin Total: 0.8 (05-06)  Bilirubin Total: 1.6 (05-04)      Trend LDH          MICROBIOLOGY:        Culture - Urine (collected 04 May 2025 06:01)  Source: Clean Catch Clean Catch (Midstream)  Final Report:    <10,000 CFU/mL Normal Urogenital Elena    Culture - Blood (collected 04 May 2025 02:30)  Source: Blood Blood-Peripheral  Preliminary Report:    No growth at 72 Hours    Culture - Blood (collected 04 May 2025 02:25)  Source: Blood Blood-Peripheral  Preliminary Report:    Growth in anaerobic bottle: Gram Negative Coccobacilli    Direct identification is available within approximately 3-5    hours either by Blood Panel Multiplexed PCR or Direct    MALDI-TOF. Details: [email or web address]  Organism: Blood Culture PCR  Organism: Blood Culture PCR    Sensitivities:      Method Type: PCR      -  Blood PCR Panel: NEG                        Rapid RVP Result: NotDetec (05-04 @ 00:59)                        Blood Gas Venous - Lactate: 1.2 (05-06 @ 22:34)    A1C with Estimated Average Glucose Result: 5.4 % (05-06-25 @ 22:34)      RADIOLOGY:    ACC: [id number] EXAM:  XR CHEST PA LAT 2V   ORDERED BY: OSCAR WOODS     PROCEDURE DATE:  05/04/2025          INTERPRETATION:  EXAMINATION: XR CHEST PA AND LATERAL    CLINICAL INDICATION: r/o pna    TECHNIQUE: 2 views; Frontal and lateral views of thechest were obtained.    COMPARISON: Chest x-ray 2/10/2023.    FINDINGS:    The heart is normal in size.  The lungs are clear. Normal pulmonary vasculature  There is no pneumothorax or pleural effusion.  No acute bony abnormality.    IMPRESSION:  Clear lungs. Findings unchanged    --- End of Report ---          KARRIE VEYG DO; Resident Radiologist  This document has been electronically signed.  ANJELICA HUNTER DO; Attending Radiologist  This document has been electronically signed. May  4 2025  8:37AM      ACC: [id number] EXAM:  CT BRAIN   ORDERED BY: CARITO GONSALES     PROCEDURE DATE:  05/04/2025          INTERPRETATION:  CLINICAL INFORMATION: headache    COMPARISON: CT head 10/4/2016.    CONTRAST:  IV Contrast: None      TECHNIQUE:  Serial axial images were obtained from the skull base to the   vertex using multi-slice helical technique. Sagittal and coronal   reformats were obtained.    FINDINGS:    VENTRICLES AND SULCI: Age appropriate involutional changes.  INTRA-AXIAL: No mass effect, acutehemorrhage, or midline shift.  There   are periventricular and subcortical white matter hypodensities,   consistent with microvascular type changes. Chronic lacunar infarct of   the left basal ganglia.  EXTRA-AXIAL: No mass or fluid collection. Basalcisterns are normal in   appearance.    VISUALIZED SINUSES:  Clear.  TYMPANOMASTOID CAVITIES:  Clear.  VISUALIZED ORBITS: Normal.  CALVARIUM: Intact.    MISCELLANEOUS: None.      IMPRESSION:  No evidence of acute intracranial pathology. If symptoms persist, MRI of   the brain may be considered for further evaluation.        --- End of Report ---          DEION MOJICA DO; Resident Radiologist  This document has been electronically signed.  ANA LILIA GRAHAM MD; Attending Radiologist  This document has been electronically signed. May  4 2025  3:22AM     Patient is a 60 year old male with PMH of HTN, HLD, recently prescribed a 3 day course of Doxycyline in 3/2025, who presents to the ER for positive blood culture.     Patient initially presented to the ER on 5/4 for his sx. Patiet states 5 days ago he experienced increased fatigue but attributed it to working an 80 hor work week. 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 The next day he then developped worsening fatigue and impaired gait as well as headache so he came to the ER on 5/4.     Labs on initial presentation notable for CBC with leukocytosis to 14.27. CMP with TB of 1.6. Full RVP negative. U/A without pyuria and urine culture negative.   CT head: No evidence of acute intracranial pathology. If symptoms persist, MRI of the brain may be considered for further evaluation.  CXR: Clear lungs. Findings unchanged    Patient discharged with supportive care.     Patient called back to the ER after 1/4 blood culture bottles from 5/4 resuled with gram negative coccobacilli ( blood pcr negative).     Denies fevers chills but states after discharge he developped sweats at night and also had 3 episodes of diarrhea in 1 day, completely loose stools, no blood. Denies abdominal pain, nausea, vomiting, new urinary sx. No recent travel or sick contacts. Social hx also notable for cath scratch last week.     On presentation today:   VSS  Labs: CBC without leukocytosis. Repeat blood cultures in process.     Abx:   -s/p Cefepime x 1 on 5/7       REVIEW OF SYSTEMS  Constitutional: No fevers, No chills, No weight loss, + fatigue   Skin: No rash, no phlebitis	  Eyes: No discharge, No change in vision	  ENMT: No sore throat, No ulcers  Respiratory: No cough, no SOB  Cardiovascular:  No chest pain, No palpitations   Gastrointestinal: No pain, No nausea, No vomiting, + diarrhea, No constipation	  Genitourinary: No dysuria, No frequency, No hesitancy, No flank pain  MSK: No Joint pain, No back pain, No edema  Neurological: + HA, no weakness, no seizures, no AMS     prior hospital charts reviewed [V]  primary team notes reviewed [V]  other consultant notes reviewed [V]    PAST MEDICAL & SURGICAL HISTORY:  Nephrolithiasis      HTN (hypertension)      No significant past surgical history          SOCIAL HISTORY:  Denied smoking/vaping/alcohol/recreational drug use  -[place]   -lives in [place], [place]  -works in restaurant business as above   -no pets at home but he visits family members with cats and dogs     FAMILY HISTORY:  -no family hx of cancer       Allergies  No Known Allergies        ANTIMICROBIALS:      ANTIMICROBIALS (past 90 days):  MEDICATIONS  (STANDING):  cefepime   IVPB   100 mL/Hr IV Intermittent (05-06-25 @ 22:40)        OTHER MEDS:   MEDICATIONS  (STANDING):  acetaminophen     Tablet .. 975 every 6 hours PRN      VITALS:  Vital Signs Last 24 Hrs  T(F): 97.9 (05-07-25 @ 10:00), Max: 100.4 (05-03-25 @ 23:27)    Vital Signs Last 24 Hrs  HR: 68 (05-07-25 @ 10:00) (60 - 71)  BP: 142/89 (05-07-25 @ 10:00) (118/69 - 152/91)  RR: 16 (05-07-25 @ 10:00)  SpO2: 96% (05-07-25 @ 10:00) (95% - 97%)  Wt(kg): --    EXAM:  General: Patient appears comfortable, no acute distress  HEENT: NCAT  CV: +S1/S2, RRR, no M/R/G  Lungs: No respiratory distress, CTA b/l, no wheezing, rales or rhonchi  Abd:  BS4+, Soft, NTND, no guarding  : No suprapubic tenderness  Neuro: AAOx3. No focal deficits noted.   Ext: No cyanosis, no edema  Msk: freely moving upper and lower extremities  Skin: No rash, no phlebitis, No erythema       Labs:                        15.8   7.00  )-----------( 260      ( 06 May 2025 22:34 )             45.3     05-06    137  |  101  |  14  ----------------------------<  91  4.0   |  24  |  0.85    Ca    9.6      06 May 2025 22:34    TPro  7.4  /  Alb  4.1  /  TBili  0.8  /  DBili  x   /  AST  33  /  ALT  27  /  AlkPhos  65  05-06      WBC Trend:  WBC Count: 7.00 (05-06-25 @ 22:34)  WBC Count: 14.27 (05-04-25 @ 00:59)          Creatine Trend:  Creatinine: 0.85 (05-06)  Creatinine: 1.00 (05-04)      Liver Biochemical Testing Trend:  Alanine Aminotransferase (ALT/SGPT): 27 (05-06)  Alanine Aminotransferase (ALT/SGPT): 26 (05-04)  Aspartate Aminotransferase (AST/SGOT): 33 (05-06-25 @ 22:34)  Aspartate Aminotransferase (AST/SGOT): 24 (05-04-25 @ 00:59)  Bilirubin Total: 0.8 (05-06)  Bilirubin Total: 1.6 (05-04)      Trend LDH          MICROBIOLOGY:        Culture - Urine (collected 04 May 2025 06:01)  Source: Clean Catch Clean Catch (Midstream)  Final Report:    <10,000 CFU/mL Normal Urogenital Elena    Culture - Blood (collected 04 May 2025 02:30)  Source: Blood Blood-Peripheral  Preliminary Report:    No growth at 72 Hours    Culture - Blood (collected 04 May 2025 02:25)  Source: Blood Blood-Peripheral  Preliminary Report:    Growth in anaerobic bottle: Gram Negative Coccobacilli    Direct identification is available within approximately 3-5    hours either by Blood Panel Multiplexed PCR or Direct    MALDI-TOF. Details: [email or web address]  Organism: Blood Culture PCR  Organism: Blood Culture PCR    Sensitivities:      Method Type: PCR      -  Blood PCR Panel: NEG                        Rapid RVP Result: NotDetec (05-04 @ 00:59)                        Blood Gas Venous - Lactate: 1.2 (05-06 @ 22:34)    A1C with Estimated Average Glucose Result: 5.4 % (05-06-25 @ 22:34)      RADIOLOGY:    ACC: [id number] EXAM:  XR CHEST PA LAT 2V   ORDERED BY: OSCAR WOODS     PROCEDURE DATE:  05/04/2025          INTERPRETATION:  EXAMINATION: XR CHEST PA AND LATERAL    CLINICAL INDICATION: r/o pna    TECHNIQUE: 2 views; Frontal and lateral views of thechest were obtained.    COMPARISON: Chest x-ray 2/10/2023.    FINDINGS:    The heart is normal in size.  The lungs are clear. Normal pulmonary vasculature  There is no pneumothorax or pleural effusion.  No acute bony abnormality.    IMPRESSION:  Clear lungs. Findings unchanged    --- End of Report ---          KARRIE EARLY DO; Resident Radiologist  This document has been electronically signed.  ANJELICA HUNTER DO; Attending Radiologist  This document has been electronically signed. May  4 2025  8:37AM      ACC: [id number] EXAM:  CT BRAIN   ORDERED BY: CARITO GONSALES     PROCEDURE DATE:  05/04/2025          INTERPRETATION:  CLINICAL INFORMATION: headache    COMPARISON: CT head 10/4/2016.    CONTRAST:  IV Contrast: None      TECHNIQUE:  Serial axial images were obtained from the skull base to the   vertex using multi-slice helical technique. Sagittal and coronal   reformats were obtained.    FINDINGS:    VENTRICLES AND SULCI: Age appropriate involutional changes.  INTRA-AXIAL: No mass effect, acutehemorrhage, or midline shift.  There   are periventricular and subcortical white matter hypodensities,   consistent with microvascular type changes. Chronic lacunar infarct of   the left basal ganglia.  EXTRA-AXIAL: No mass or fluid collection. Basalcisterns are normal in   appearance.    VISUALIZED SINUSES:  Clear.  TYMPANOMASTOID CAVITIES:  Clear.  VISUALIZED ORBITS: Normal.  CALVARIUM: Intact.    MISCELLANEOUS: None.      IMPRESSION:  No evidence of acute intracranial pathology. If symptoms persist, MRI of   the brain may be considered for further evaluation.        --- End of Report ---          DEION MOJICA DO; Resident Radiologist  This document has been electronically signed.  ANA LILIA GRAHAM MD; Attending Radiologist  This document has been electronically signed. May  4 2025  3:22AM

## 2025-05-08 LAB
ALBUMIN SERPL ELPH-MCNC: 3.8 G/DL — SIGNIFICANT CHANGE UP (ref 3.3–5)
ALP SERPL-CCNC: 61 U/L — SIGNIFICANT CHANGE UP (ref 40–120)
ALT FLD-CCNC: 18 U/L — SIGNIFICANT CHANGE UP (ref 4–41)
ANION GAP SERPL CALC-SCNC: 12 MMOL/L — SIGNIFICANT CHANGE UP (ref 7–14)
AST SERPL-CCNC: 14 U/L — SIGNIFICANT CHANGE UP (ref 4–40)
BASOPHILS # BLD AUTO: 0.02 K/UL — SIGNIFICANT CHANGE UP (ref 0–0.2)
BASOPHILS NFR BLD AUTO: 0.3 % — SIGNIFICANT CHANGE UP (ref 0–2)
BILIRUB SERPL-MCNC: 0.9 MG/DL — SIGNIFICANT CHANGE UP (ref 0.2–1.2)
BLOOD GAS VENOUS COMPREHENSIVE RESULT: SIGNIFICANT CHANGE UP
BUN SERPL-MCNC: 12 MG/DL — SIGNIFICANT CHANGE UP (ref 7–23)
CALCIUM SERPL-MCNC: 9 MG/DL — SIGNIFICANT CHANGE UP (ref 8.4–10.5)
CHLORIDE SERPL-SCNC: 106 MMOL/L — SIGNIFICANT CHANGE UP (ref 98–107)
CO2 SERPL-SCNC: 21 MMOL/L — LOW (ref 22–31)
CREAT SERPL-MCNC: 0.9 MG/DL — SIGNIFICANT CHANGE UP (ref 0.5–1.3)
CULTURE RESULTS: ABNORMAL
CULTURE RESULTS: ABNORMAL
EGFR: 98 ML/MIN/1.73M2 — SIGNIFICANT CHANGE UP
EGFR: 98 ML/MIN/1.73M2 — SIGNIFICANT CHANGE UP
EOSINOPHIL # BLD AUTO: 0.18 K/UL — SIGNIFICANT CHANGE UP (ref 0–0.5)
EOSINOPHIL NFR BLD AUTO: 2.8 % — SIGNIFICANT CHANGE UP (ref 0–6)
GI PCR PANEL: SIGNIFICANT CHANGE UP
GLUCOSE SERPL-MCNC: 104 MG/DL — HIGH (ref 70–99)
GRAM STN FLD: ABNORMAL
HCT VFR BLD CALC: 44.3 % — SIGNIFICANT CHANGE UP (ref 39–50)
HGB BLD-MCNC: 15.6 G/DL — SIGNIFICANT CHANGE UP (ref 13–17)
IANC: 4.05 K/UL — SIGNIFICANT CHANGE UP (ref 1.8–7.4)
IMM GRANULOCYTES NFR BLD AUTO: 0.3 % — SIGNIFICANT CHANGE UP (ref 0–0.9)
LYMPHOCYTES # BLD AUTO: 1.5 K/UL — SIGNIFICANT CHANGE UP (ref 1–3.3)
LYMPHOCYTES # BLD AUTO: 23.5 % — SIGNIFICANT CHANGE UP (ref 13–44)
MCHC RBC-ENTMCNC: 29.8 PG — SIGNIFICANT CHANGE UP (ref 27–34)
MCHC RBC-ENTMCNC: 35.2 G/DL — SIGNIFICANT CHANGE UP (ref 32–36)
MCV RBC AUTO: 84.7 FL — SIGNIFICANT CHANGE UP (ref 80–100)
MONOCYTES # BLD AUTO: 0.6 K/UL — SIGNIFICANT CHANGE UP (ref 0–0.9)
MONOCYTES NFR BLD AUTO: 9.4 % — SIGNIFICANT CHANGE UP (ref 2–14)
NEUTROPHILS # BLD AUTO: 4.05 K/UL — SIGNIFICANT CHANGE UP (ref 1.8–7.4)
NEUTROPHILS NFR BLD AUTO: 63.7 % — SIGNIFICANT CHANGE UP (ref 43–77)
NRBC # BLD AUTO: 0 K/UL — SIGNIFICANT CHANGE UP (ref 0–0)
NRBC # FLD: 0 K/UL — SIGNIFICANT CHANGE UP (ref 0–0)
NRBC BLD AUTO-RTO: 0 /100 WBCS — SIGNIFICANT CHANGE UP (ref 0–0)
ORGANISM # SPEC MICROSCOPIC CNT: ABNORMAL
ORGANISM # SPEC MICROSCOPIC CNT: ABNORMAL
PLATELET # BLD AUTO: 249 K/UL — SIGNIFICANT CHANGE UP (ref 150–400)
POTASSIUM SERPL-MCNC: 4.1 MMOL/L — SIGNIFICANT CHANGE UP (ref 3.5–5.3)
POTASSIUM SERPL-SCNC: 4.1 MMOL/L — SIGNIFICANT CHANGE UP (ref 3.5–5.3)
PROT SERPL-MCNC: 6.7 G/DL — SIGNIFICANT CHANGE UP (ref 6–8.3)
RBC # BLD: 5.23 M/UL — SIGNIFICANT CHANGE UP (ref 4.2–5.8)
RBC # FLD: 12.1 % — SIGNIFICANT CHANGE UP (ref 10.3–14.5)
SODIUM SERPL-SCNC: 139 MMOL/L — SIGNIFICANT CHANGE UP (ref 135–145)
SPECIMEN SOURCE: SIGNIFICANT CHANGE UP
WBC # BLD: 6.37 K/UL — SIGNIFICANT CHANGE UP (ref 3.8–10.5)
WBC # FLD AUTO: 6.37 K/UL — SIGNIFICANT CHANGE UP (ref 3.8–10.5)

## 2025-05-08 PROCEDURE — 99213 OFFICE O/P EST LOW 20 MIN: CPT

## 2025-05-08 PROCEDURE — 74177 CT ABD & PELVIS W/CONTRAST: CPT | Mod: 26

## 2025-05-08 PROCEDURE — 99233 SBSQ HOSP IP/OBS HIGH 50: CPT

## 2025-05-08 RX ORDER — AMPICILLIN SODIUM AND SULBACTAM SODIUM 1; .5 G/1; G/1
3 INJECTION, POWDER, FOR SOLUTION INTRAMUSCULAR; INTRAVENOUS EVERY 6 HOURS
Refills: 0 | Status: DISCONTINUED | OUTPATIENT
Start: 2025-05-08 | End: 2025-05-09

## 2025-05-08 RX ADMIN — AMPICILLIN SODIUM AND SULBACTAM SODIUM 200 GRAM(S): 1; .5 INJECTION, POWDER, FOR SOLUTION INTRAMUSCULAR; INTRAVENOUS at 16:51

## 2025-05-08 RX ADMIN — Medication 100 MILLIGRAM(S): at 05:52

## 2025-05-08 RX ADMIN — AMPICILLIN SODIUM AND SULBACTAM SODIUM 200 GRAM(S): 1; .5 INJECTION, POWDER, FOR SOLUTION INTRAMUSCULAR; INTRAVENOUS at 23:05

## 2025-05-08 RX ADMIN — CEFEPIME 100 MILLIGRAM(S): 2 INJECTION, POWDER, FOR SOLUTION INTRAVENOUS at 05:18

## 2025-05-08 RX ADMIN — CEFEPIME 100 MILLIGRAM(S): 2 INJECTION, POWDER, FOR SOLUTION INTRAVENOUS at 13:37

## 2025-05-08 NOTE — ED CDU PROVIDER SUBSEQUENT DAY NOTE - HISTORY
59 yo male, PMH HTN, seen in this ED 5/4/25 (had c/o malaise, headache, nausea, abdominal pain) was called back after 1/2 blood culture sets grew Gram negative coccibacilli in anaerobic bottle.  In the ED, pt stated he was feeling better than when he presented 4 days prior, but did admit he did have night sweats. Lab analysis in the ER unremarkable. ED team placed pt in CDU for ID consult/recommendations.  ID consulted with recs appreciated; pt was started on IV cefepime and flagyl.  In the interim, pt objectively noted to be resting comfortably; pt has been clinically stable; no issues thus far.  AM labs are ordered.

## 2025-05-08 NOTE — ED CDU PROVIDER SUBSEQUENT DAY NOTE - CLINICAL SUMMARY MEDICAL DECISION MAKING FREE TEXT BOX
IV cefepime and Flagyl; additional recs per ID team following pt, monitor vitals, general observation care / monitoring.  AM labs are ordered.

## 2025-05-08 NOTE — PROGRESS NOTE ADULT - SUBJECTIVE AND OBJECTIVE BOX
Follow Up:      Interval History/ROS:  feels okay       blood culture identified Dialister     patient endorses dental pian right upper molar  was looking for a dentist PTA     Allergies  No Known Allergies        ANTIMICROBIALS:  ampicillin/sulbactam  IVPB 3 every 6 hours      OTHER MEDS:  acetaminophen     Tablet .. 975 milliGRAM(s) Oral every 6 hours PRN      Vital Signs Last 24 Hrs  T(C): 36.7 (08 May 2025 14:24), Max: 36.9 (08 May 2025 09:46)  T(F): 98 (08 May 2025 14:24), Max: 98.4 (08 May 2025 09:46)  HR: 67 (08 May 2025 14:24) (58 - 67)  BP: 149/80 (08 May 2025 14:24) (128/74 - 149/80)  BP(mean): --  RR: 18 (08 May 2025 14:24) (16 - 18)  SpO2: 97% (08 May 2025 14:24) (95% - 99%)    Parameters below as of 08 May 2025 14:24  Patient On (Oxygen Delivery Method): room air        PHYSICAL EXAM:  Constitutional: Not in acute distress  Eyes: No icterus.  Oral cavity: swelling right upper  Neck: Supple  RS: Chest clear   CVS: S1, S2   Abdomen: Soft. No guarding/rigidity/tenderness.   Extremities: Warm. No pedal edema  Skin: No lesions noted  Neuro: Alert, oriented to time/place/person  Cranial nerves 2-12 grossly normal. No focal abnormalities                          15.6   6.37  )-----------( 249      ( 08 May 2025 07:01 )             44.3       05-08    139  |  106  |  12  ----------------------------<  104[H]  4.1   |  21[L]  |  0.90    Ca    9.0      08 May 2025 07:01    TPro  6.7  /  Alb  3.8  /  TBili  0.9  /  DBili  x   /  AST  14  /  ALT  18  /  AlkPhos  61  05-08      Urinalysis Basic - ( 08 May 2025 07:01 )    Color: x / Appearance: x / SG: x / pH: x  Gluc: 104 mg/dL / Ketone: x  / Bili: x / Urobili: x   Blood: x / Protein: x / Nitrite: x   Leuk Esterase: x / RBC: x / WBC x   Sq Epi: x / Non Sq Epi: x / Bacteria: x        MICROBIOLOGY:  v  Blood Blood-Peripheral  05-06-25   No growth at 24 hours  --  --      Blood Blood-Peripheral  05-06-25   No growth at 24 hours  --  --      Clean Catch Clean Catch (Midstream)  05-04-25   <10,000 CFU/mL Normal Urogenital Elena  --  --      Blood Blood-Peripheral  05-04-25   No growth at 4 days  --  --      Blood Blood-Peripheral  05-04-25   Growth in anaerobic bottle: Dialister pneumosinte Susceptibility to  follow.  Direct identification is available within approximately 3-5  hours either by Blood Panel Multiplexed PCR or Direct  MALDI-TOF. Details: [email or web address]  --  Blood Culture PCR          Rapid RVP Result: Meseret (05-04 @ 00:59)        RADIOLOGY:

## 2025-05-08 NOTE — ED CDU PROVIDER SUBSEQUENT DAY NOTE - PROGRESS NOTE DETAILS
Infectious disease Dr. Goodrich recommending CT abdomen and pelvis.  She recommends discontinuing cefepime and Flagyl.  She recommends starting Unasyn 3 g every 6 hours.

## 2025-05-08 NOTE — PROGRESS NOTE ADULT - ASSESSMENT
Patient is a 60 year old male with PMH of HTN, HLD, recently prescribed a 3 day course of Doxycycline in 3/2025, who presents to the ER for positive blood culture.     Patient initially presented to the ER on 5/4 for his sx. Patiet states 5 days ago he experienced increased fatigue but attributed it to working an 80 hour work week. The next day he then developed worsening fatigue and impaired gait as well as headache so he came to the ER on 5/4. Denies fevers chills but states after recent ER visit,  he developed sweats at night and also had 3 episodes of diarrhea in 1 day, completely loose stools, no blood. Denies abdominal pain, nausea, vomiting, new urinary sx. No recent travel or sick contacts. 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 Also notes his family members own cats and the cats frequently scratch him, last was last week to the hand.     Labs on initial presentation notable for CBC with leukocytosis to 14.27. CMP with TB of 1.6. Full RVP negative. U/A without pyuria and urine culture negative.   CT head: No evidence of acute intracranial pathology. If symptoms persist, MRI of the brain may be considered for further evaluation.  CXR: Clear lungs. Findings unchanged    Patient called back to the ER after 1/4 blood culture bottles from 5/4 resulted with gram negative coccobacilli in anaerobic bottle ( blood pcr negative). Identified today Dialister pneumosinte    On presentation yesterday  VSS  Labs: CBC without leukocytosis. Repeat blood cultures in process.     Abx:   -s/p Cefepime x 1 on 5/7    #1/4 blood culture bottles with gram negative coccobacilli (negative blood PCR) identified Dialister     -GI organism formerly bacteroides   today endorses tooth ache, swelling right upper molar   ? dental source vs abdominal source     Suggest:  Unasyn 3 gm iv q 6 h   CT abd/ pelvis w contrast   dental eval   follow final susceptibilities       will follow

## 2025-05-09 VITALS
RESPIRATION RATE: 16 BRPM | SYSTOLIC BLOOD PRESSURE: 148 MMHG | TEMPERATURE: 98 F | OXYGEN SATURATION: 96 % | HEART RATE: 64 BPM | DIASTOLIC BLOOD PRESSURE: 87 MMHG

## 2025-05-09 LAB
-  BLOOD PCR PANEL: SIGNIFICANT CHANGE UP
ALBUMIN SERPL ELPH-MCNC: 3.5 G/DL — SIGNIFICANT CHANGE UP (ref 3.3–5)
ALP SERPL-CCNC: 57 U/L — SIGNIFICANT CHANGE UP (ref 40–120)
ALT FLD-CCNC: 18 U/L — SIGNIFICANT CHANGE UP (ref 4–41)
ANION GAP SERPL CALC-SCNC: 14 MMOL/L — SIGNIFICANT CHANGE UP (ref 7–14)
AST SERPL-CCNC: <5 U/L — SIGNIFICANT CHANGE UP (ref 4–40)
BASOPHILS # BLD AUTO: 0.05 K/UL — SIGNIFICANT CHANGE UP (ref 0–0.2)
BASOPHILS NFR BLD AUTO: 0.7 % — SIGNIFICANT CHANGE UP (ref 0–2)
BILIRUB SERPL-MCNC: 0.8 MG/DL — SIGNIFICANT CHANGE UP (ref 0.2–1.2)
BLOOD GAS VENOUS COMPREHENSIVE RESULT: SIGNIFICANT CHANGE UP
BUN SERPL-MCNC: 10 MG/DL — SIGNIFICANT CHANGE UP (ref 7–23)
CALCIUM SERPL-MCNC: 9 MG/DL — SIGNIFICANT CHANGE UP (ref 8.4–10.5)
CHLORIDE SERPL-SCNC: 104 MMOL/L — SIGNIFICANT CHANGE UP (ref 98–107)
CO2 SERPL-SCNC: 19 MMOL/L — LOW (ref 22–31)
CREAT SERPL-MCNC: 0.79 MG/DL — SIGNIFICANT CHANGE UP (ref 0.5–1.3)
CULTURE RESULTS: ABNORMAL
EGFR: 102 ML/MIN/1.73M2 — SIGNIFICANT CHANGE UP
EGFR: 102 ML/MIN/1.73M2 — SIGNIFICANT CHANGE UP
EOSINOPHIL # BLD AUTO: 0.2 K/UL — SIGNIFICANT CHANGE UP (ref 0–0.5)
EOSINOPHIL NFR BLD AUTO: 2.7 % — SIGNIFICANT CHANGE UP (ref 0–6)
GLUCOSE SERPL-MCNC: 97 MG/DL — SIGNIFICANT CHANGE UP (ref 70–99)
HCT VFR BLD CALC: 46.8 % — SIGNIFICANT CHANGE UP (ref 39–50)
HGB BLD-MCNC: 16.3 G/DL — SIGNIFICANT CHANGE UP (ref 13–17)
IANC: 4.52 K/UL — SIGNIFICANT CHANGE UP (ref 1.8–7.4)
IMM GRANULOCYTES NFR BLD AUTO: 0.4 % — SIGNIFICANT CHANGE UP (ref 0–0.9)
LYMPHOCYTES # BLD AUTO: 1.79 K/UL — SIGNIFICANT CHANGE UP (ref 1–3.3)
LYMPHOCYTES # BLD AUTO: 24.5 % — SIGNIFICANT CHANGE UP (ref 13–44)
MCHC RBC-ENTMCNC: 30.1 PG — SIGNIFICANT CHANGE UP (ref 27–34)
MCHC RBC-ENTMCNC: 34.8 G/DL — SIGNIFICANT CHANGE UP (ref 32–36)
MCV RBC AUTO: 86.3 FL — SIGNIFICANT CHANGE UP (ref 80–100)
METHOD TYPE: SIGNIFICANT CHANGE UP
MONOCYTES # BLD AUTO: 0.73 K/UL — SIGNIFICANT CHANGE UP (ref 0–0.9)
MONOCYTES NFR BLD AUTO: 10 % — SIGNIFICANT CHANGE UP (ref 2–14)
NEUTROPHILS # BLD AUTO: 4.52 K/UL — SIGNIFICANT CHANGE UP (ref 1.8–7.4)
NEUTROPHILS NFR BLD AUTO: 61.7 % — SIGNIFICANT CHANGE UP (ref 43–77)
NRBC # BLD AUTO: 0 K/UL — SIGNIFICANT CHANGE UP (ref 0–0)
NRBC # FLD: 0 K/UL — SIGNIFICANT CHANGE UP (ref 0–0)
NRBC BLD AUTO-RTO: 0 /100 WBCS — SIGNIFICANT CHANGE UP (ref 0–0)
PLATELET # BLD AUTO: 252 K/UL — SIGNIFICANT CHANGE UP (ref 150–400)
POTASSIUM SERPL-MCNC: 4.5 MMOL/L — SIGNIFICANT CHANGE UP (ref 3.5–5.3)
POTASSIUM SERPL-SCNC: 4.5 MMOL/L — SIGNIFICANT CHANGE UP (ref 3.5–5.3)
PROT SERPL-MCNC: 6.6 G/DL — SIGNIFICANT CHANGE UP (ref 6–8.3)
RBC # BLD: 5.42 M/UL — SIGNIFICANT CHANGE UP (ref 4.2–5.8)
RBC # FLD: 12.3 % — SIGNIFICANT CHANGE UP (ref 10.3–14.5)
SODIUM SERPL-SCNC: 137 MMOL/L — SIGNIFICANT CHANGE UP (ref 135–145)
WBC # BLD: 7.32 K/UL — SIGNIFICANT CHANGE UP (ref 3.8–10.5)
WBC # FLD AUTO: 7.32 K/UL — SIGNIFICANT CHANGE UP (ref 3.8–10.5)

## 2025-05-09 PROCEDURE — 99214 OFFICE O/P EST MOD 30 MIN: CPT

## 2025-05-09 PROCEDURE — 99239 HOSP IP/OBS DSCHRG MGMT >30: CPT

## 2025-05-09 RX ORDER — LACTOBACILLUS ACIDOPHILUS/PECT 75 MM-100
1 CAPSULE ORAL ONCE
Refills: 0 | Status: COMPLETED | OUTPATIENT
Start: 2025-05-09 | End: 2025-05-09

## 2025-05-09 RX ORDER — ERTAPENEM SODIUM 1 G/1
1000 INJECTION, POWDER, LYOPHILIZED, FOR SOLUTION INTRAMUSCULAR; INTRAVENOUS EVERY 24 HOURS
Refills: 0 | Status: ACTIVE | OUTPATIENT
Start: 2025-05-09 | End: 2025-05-17

## 2025-05-09 RX ORDER — ERTAPENEM SODIUM 1 G/1
1 INJECTION, POWDER, LYOPHILIZED, FOR SOLUTION INTRAMUSCULAR; INTRAVENOUS
Qty: 10 | Refills: 0
Start: 2025-05-09 | End: 2025-05-18

## 2025-05-09 RX ADMIN — Medication 1 TABLET(S): at 15:42

## 2025-05-09 RX ADMIN — AMPICILLIN SODIUM AND SULBACTAM SODIUM 200 GRAM(S): 1; .5 INJECTION, POWDER, FOR SOLUTION INTRAMUSCULAR; INTRAVENOUS at 11:17

## 2025-05-09 RX ADMIN — ERTAPENEM SODIUM 100 MILLIGRAM(S): 1 INJECTION, POWDER, LYOPHILIZED, FOR SOLUTION INTRAMUSCULAR; INTRAVENOUS at 15:42

## 2025-05-09 RX ADMIN — AMPICILLIN SODIUM AND SULBACTAM SODIUM 200 GRAM(S): 1; .5 INJECTION, POWDER, FOR SOLUTION INTRAMUSCULAR; INTRAVENOUS at 05:05

## 2025-05-09 NOTE — PROGRESS NOTE ADULT - ASSESSMENT
Patient is a 60 year old male with PMH of HTN, HLD, recently prescribed a 3 day course of Doxycycline in 3/2025, who presents to the ER for positive blood culture.     Patient initially presented to the ER on 5/4 for his sx. Patiet states 5 days ago he experienced increased fatigue but attributed it to working an 80 hour work week. The next day he then developed worsening fatigue and impaired gait as well as headache so he came to the ER on 5/4. Denies fevers chills but states after recent ER visit,  he developed sweats at night and also had 3 episodes of diarrhea in 1 day, completely loose stools, no blood. Denies abdominal pain, nausea, vomiting, new urinary sx. No recent travel or sick contacts. 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 Also notes his family members own cats and the cats frequently scratch him, last was last week to the hand.     Labs on initial presentation notable for CBC with leukocytosis to 14.27. CMP with TB of 1.6. Full RVP negative. U/A without pyuria and urine culture negative.   CT head: No evidence of acute intracranial pathology. If symptoms persist, MRI of the brain may be considered for further evaluation.  CXR: Clear lungs. Findings unchanged    Patient called back to the ER after 1/4 blood culture bottles from 5/4 resulted with gram negative coccobacilli in anaerobic bottle ( blood pcr negative). Identified today Dialister pneumosinte  second blood culture growing same organism now    On presentation   VSS  Labs: CBC without leukocytosis. Repeat blood cultures no growth x 48h    Abx:   -s/p Cefepime x 1 on 5/7    Anaerobic bacteremia    #2/4 blood culture bottles with gram negative coccobacilli (negative blood PCR) in anaerobic bottles identified Dialister     -GI organism formerly bacteroides    endorses tooth ache, swelling right upper molar   ? dental source vs abdominal source   CT with findings c/w enteritis    Micro will send out to reference lab for sensitivities      Suggest:    d/c unasyn   start ertapenem 1 gm iv q 24 h --> 5/17  if d/c home can f/u with me in office after infusion completed

## 2025-05-09 NOTE — ADVANCED PRACTICE NURSE CONSULT - ASSESSMENT
Patient is aware and alert. Midline insertion along with risks, benefits, possible complications and infection prevention explained to patient who verbalized understanding. All questions addressed and patient gave verbal consent to place midline. Left arm cleansed with CHG. Using sterile technique under ultra sound guidance, placed PowerGlide Pro Midline 20G/10cm into Left Basilic vein. Brisk blood return and flushed with 20Mls of normal saline. Minimal blood loss and patient tolerated procedure well. CHG dressing placed. All sharps accounted for. Report given to district RN and ordering provider. LOT#: [id number] , REF#: [id number]

## 2025-05-09 NOTE — ED CDU PROVIDER SUBSEQUENT DAY NOTE - CLINICAL SUMMARY MEDICAL DECISION MAKING FREE TEXT BOX
59 yo male, PMH HTN, seen in this ED 5/4/25 (had c/o malaise, headache, nausea, abdominal pain) was called back after 1/2 blood culture sets grew Gram negative coccibacilli in anaerobic bottle.  In the ED, pt stated he was feeling better than when he presented 4 days prior, but did admit he did have night sweats. Lab analysis in the ER unremarkable. ED team placed pt in CDU for ID consult/recommendations.  ID consulted with recs appreciated; pt was started on IV cefepime and flagyl.   On 5/8, ID advised switching to Unasyn IV; GI PCR and CT abd/pelvis imaging advised.  GI PCR result: NotDetected (final result).  CT abd/pelvis was performed; per official radiology report: "...IMPRESSION:  Thickened jejunal loops and hyperemia in the left upper abdomen, which can be seen in infectious or inflammatory enteritis.".  Pt has denied diarrhea.  [facility] called late 5/8 evening regarding 2nd set of BCX from 5/4 ED visit:  BCX anaerobic bottle: Gram Negative coccobacilli and Gram Positive Rods.  VSS, pt has been afebrile.  ID attending Dr. Goodrich messaged regarding these interim results; awaiting further ID recs.  In the interim, pt objectively noted to be resting comfortably; pt has been clinically stable; no issues thus far.  AM labs are ordered. 59 yo male, PMH HTN, seen in this ED 5/3/25 (had c/o malaise, headache, nausea, abdominal pain) was called back after 1/2 blood culture sets (5/4 test date of both BCX sets) grew Gram negative coccibacilli in anaerobic bottle.  In the ED on the current visit, pt stated he was feeling better than prior ED visit, but did admit he did have night sweats. Lab analysis in the ER unremarkable. ED team placed pt in CDU for ID consult/recommendations.  ID consulted with recs appreciated; pt was started on IV cefepime and flagyl.   On 5/8, ID advised switching to Unasyn IV; GI PCR and CT abd/pelvis imaging advised.  GI PCR result: NotDetected (final result).  CT abd/pelvis was performed; per official radiology report: "...IMPRESSION:  Thickened jejunal loops and hyperemia in the left upper abdomen, which can be seen in infectious or inflammatory enteritis.".  Pt has denied diarrhea.  [facility] called late 5/8 evening regarding 2nd set of BCX from 5/4 ED visit:  BCX anaerobic bottle: Gram Negative coccobacilli and Gram Positive Rods.  VSS, pt has been afebrile.  ID attending Dr. Goodrich messaged regarding these interim results; awaiting further ID recs.  In the interim, pt objectively noted to be resting comfortably; pt has been clinically stable; no issues thus far.  AM labs are ordered.

## 2025-05-09 NOTE — ED CDU PROVIDER DISPOSITION NOTE - PATIENT PORTAL LINK FT
You can access the FollowMyHealth Patient Portal offered by [facility] by registering at the following website: [email or web address]. By joining [employer]’s FollowMyHealth portal, you will also be able to view your health information using other applications (apps) compatible with our system.

## 2025-05-09 NOTE — ED CDU PROVIDER SUBSEQUENT DAY NOTE - NS ED ATTENDING STATEMENT MOD
Pt states' SOB , not feeling x 2 days. Pt states" I had Thanksgiving Day dinner, when a guest was COVID +.
This was a shared visit with the ELIZABETH. I reviewed and verified the documentation.
This was a shared visit with the ELIZABETH. I reviewed and verified the documentation.

## 2025-05-09 NOTE — ED CDU PROVIDER DISPOSITION NOTE - CLINICAL COURSE
59 yo male, PMH HTN, seen in this ED 5/3/25 (had c/o malaise, headache, nausea, abdominal pain) was called back after 1/2 blood culture sets (5/4 test date of both BCX sets) grew Gram negative coccibacilli in anaerobic bottle.  In the ED on the current visit, pt stated he was feeling better than prior ED visit, but did admit he did have night sweats. Lab analysis in the ER unremarkable. ED team placed pt in CDU for ID consult/recommendations.  ID consulted with recs appreciated; pt was started on IV cefepime and flagyl.   On 5/8, ID advised switching to Unasyn IV; GI PCR and CT abd/pelvis imaging advised.  GI PCR result: NotDetected (final result).  CT abd/pelvis was performed; per official radiology report: "...IMPRESSION:  Thickened jejunal loops and hyperemia in the left upper abdomen, which can be seen in infectious or inflammatory enteritis.".  Pt has denied diarrhea.  [facility] Lab called late 5/8 evening regarding 2nd set of BCX from 5/4 ED visit:  BCX anaerobic bottle: Gram Negative coccobacilli and Gram Positive Rods.  VSS, pt has been afebrile.  ID attending Dr. Goodrich messaged regarding these interim results; awaiting further ID recs.  In the interim, pt objectively noted to be resting comfortably; pt has been clinically stable overnight. Reassessed this morning. States he is feeling very well. Vitals signs stable, repeat labs stable- no WBC, no lactate. Repeat blood cultures drawn here prelim negative. Seen by ID. Cm PENA. Strict ED return precautions discussed. Patient understands and agrees. 59 yo male, PMH HTN, seen in this ED 5/3/25 (had c/o malaise, headache, nausea, abdominal pain) was called back after 1/2 blood culture sets (5/4 test date of both BCX sets) grew Gram negative coccibacilli in anaerobic bottle.  In the ED on the current visit, pt stated he was feeling better than prior ED visit, but did admit he did have night sweats. Lab analysis in the ER unremarkable. ED team placed pt in CDU for ID consult/recommendations.  ID consulted with recs appreciated; pt was started on IV cefepime and flagyl.   On 5/8, ID advised switching to Unasyn IV; GI PCR and CT abd/pelvis imaging advised.  GI PCR result: NotDetected (final result).  CT abd/pelvis was performed; per official radiology report: "...IMPRESSION:  Thickened jejunal loops and hyperemia in the left upper abdomen, which can be seen in infectious or inflammatory enteritis.".  Pt has denied diarrhea.  [facility] Lab called late 5/8 evening regarding 2nd set of BCX from 5/4 ED visit:  BCX anaerobic bottle: Gram Negative coccobacilli and Gram Positive Rods.  VSS, pt has been afebrile.  ID attending Dr. Goodrich messaged regarding these interim results; awaiting further ID recs.  In the interim, pt objectively noted to be resting comfortably; pt has been clinically stable overnight. Reassessed this morning. States he is feeling very well. Vitals signs stable, repeat labs stable- no WBC, no lactate. Repeat blood cultures drawn here prelim negative. Seen by ID. REcs IV home infusion Ertapenem 1g IV x 10days. Through case management and [facility], process set up. Will DC. Strict ED return precautions discussed. Patient understands and agrees. Initial (On Arrival)

## 2025-05-09 NOTE — PROGRESS NOTE ADULT - SUBJECTIVE AND OBJECTIVE BOX
Follow Up:      Interval History/ROS:  feels well    blood culture identified Dialister       Allergies  No Known Allergies        ANTIMICROBIALS:  ampicillin/sulbactam  IVPB 3 every 6 hours      OTHER MEDS:  acetaminophen     Tablet .. 975 milliGRAM(s) Oral every 6 hours PRN      Vital Signs Last 24 Hrs  T(F): 97.8 (05-09-25 @ 09:41), Max: 98.7 (05-09-25 @ 02:00)  HR: 62 (05-09-25 @ 09:41)  BP: 144/90 (05-09-25 @ 09:41)  RR: 16 (05-09-25 @ 09:41)  SpO2: 96% (05-09-25 @ 09:41) (95% - 97%)        PHYSICAL EXAM:  Constitutional: Not in acute distress  Eyes: No icterus.  Oral cavity: swelling right upper  Neck: Supple  RS: Chest clear   CVS: S1, S2   Abdomen: Soft. No guarding/rigidity/tenderness.   Extremities: Warm. No pedal edema  Skin: No lesions noted  Neuro: Alert, oriented to time/place/person  Cranial nerves 2-12 grossly normal. No focal abnormalities                                     16.3   7.32  )-----------( 252      ( 09 May 2025 05:46 )             46.8 05-09    137  |  104  |  10  ----------------------------<  97  4.5   |  19  |  0.79  Ca    9.0      09 May 2025 05:46  TPro  6.6  /  Alb  3.5  /  TBili  0.8  /  DBili  x   /  AST  <5  /  ALT  18  /  AlkPhos  57  05-09      Urinalysis Basic - ( 08 May 2025 07:01 )    Color: x / Appearance: x / SG: x / pH: x  Gluc: 104 mg/dL / Ketone: x  / Bili: x / Urobili: x   Blood: x / Protein: x / Nitrite: x   Leuk Esterase: x / RBC: x / WBC x   Sq Epi: x / Non Sq Epi: x / Bacteria: x        MICROBIOLOGY:  v  Blood Blood-Peripheral  05-06-25   No growth at 48 hours  --  --      Blood Blood-Peripheral  05-06-25   No growth at 48 hours  --  --      Clean Catch Clean Catch (Midstream)  05-04-25   <10,000 CFU/mL Normal Urogenital Elena  --  --      Blood Blood-Peripheral  05-04-25   No growth at 4 days  --  --      Blood Blood-Peripheral  05-04-25   Growth in anaerobic bottle: Dialister pneumosinte Susceptibility to  follow.  Direct identification is available within approximately 3-5  hours either by Blood Panel Multiplexed PCR or Direct  MALDI-TOF. Details: [email or web address]  --  Blood Culture PCR    Culture - Blood (05.04.25 @ 02:30)   Gram Stain:   Growth in anaerobic bottle: Gram Negative Coccobacilli and Gram Positive   Rods  - Blood PCR Panel: NEG  Specimen Source: Blood Blood-Peripheral  Organism: Blood Culture PCR  Culture Results:   Growth in anaerobic bottle: Gram Negative Coccobacilli and Gram Positive   Rods   Direct identification is available within approximately 3-5   hours either by Blood Panel Multiplexed PCR or Direct   MALDI-TOF. Details: [email or web address]  Organism Identification: Blood Culture PCR  Method Type: PCR      Rapid RVP Result: NotDetec (05-04 @ 00:59)        RADIOLOGY:  < from: CT Abdomen and Pelvis w/ IV Cont (05.08.25 @ 19:19) >    ACC: [id number] EXAM:  CT ABDOMEN AND PELVIS IC   ORDERED BY: JACKIE CHIN     PROCEDURE DATE:  05/08/2025          INTERPRETATION:  CLINICAL INFORMATION: Diarrhea.    COMPARISON: None.    CONTRAST/COMPLICATIONS:  IV Contrast: Omnipaque 350  90 cc administered   10 cc discarded  Oral Contrast: NONE  .    PROCEDURE:  CT of the Abdomen and Pelvis was performed.  Sagittal and coronal reformats were performed.    FINDINGS:  LOWER CHEST: Within normal limits.    LIVER: Within normal limits.  BILE DUCTS:Normal caliber.  GALLBLADDER: Within normal limits.  SPLEEN: Within normal limits.  PANCREAS: Within normal limits.  ADRENALS: Within normal limits.  KIDNEYS/URETERS: Within normal limits.    BLADDER: Within normal limits.  REPRODUCTIVE ORGANS: Prostate is enlarged.    BOWEL: No bowel obstruction. Jejunal wall thickening and hyperemia in the   left abdomen. Appendix is not visualized.  PERITONEUM/RETROPERITONEUM: Within normal limits.  VESSELS: Atherosclerotic changes.  LYMPH NODES: No lymphadenopathy.  ABDOMINAL WALL: Small bilateral fat-containing inguinal hernias.  BONES: Degenerative changes.    IMPRESSION:  Thickened jejunal loops and hyperemia in the left upper abdomen, which   can be seen in infectious or inflammatory enteritis.            --- End of Report ---          WILFREDO VASQUES MD; Resident Radiologist  This document has been electronically signed.  KHALIF GUDINO MD; Attending Radiologist  This document has been electronically signed. May  8 2025  8:55PM    < end of copied text >

## 2025-05-09 NOTE — ED CDU PROVIDER DISPOSITION NOTE - NSFOLLOWUPINSTRUCTIONS_ED_ALL_ED_FT
Follow up with your PMD within 1-2 days or you can call our clinic at [phone] for an appointment  Take all of your other medications as previously prescribed.  Stay well hydrated.   Worsening, continued or ANY new concerning symptoms return to the Emergency Department. Follow up with your PMD within 1-2 days or you can call our clinic at [phone] for an appointment  Follow up with Infectious Disease within the week- referral above or you can call: Find a Physician helpline ([phone]) for assistance   Continue the antibiotic daily through the IV through [facility]- a nurse will come to your house daily  Take all of your other medications as previously prescribed.  Stay well hydrated.   Worsening, continued or ANY new concerning symptoms return to the Emergency Department.

## 2025-05-09 NOTE — ED CDU PROVIDER DISPOSITION NOTE - ATTENDING CONTRIBUTION TO CARE
Pt was seen and evaluated by me. Pt is a 59 y/o male with PMHx of HTN who presented to the ED after being called back for + blood cultures. Pt initially presented to the ED for headache, nausea, fatigue, and abd pain. At that time cultures where done and pt was called back as they had positive blood culture for coccibacilli. Pt notes symptoms have resolved and was sent to OBS to monitor for repeat cultures and ID f/u. Pt denies any fever, chills, nausea, vomiting, SOB, chest pain, or abd pain. Pt did notice having an area of swelling to roof of mouth that came to a head and then drained and has had no symptoms since.   VITALS: Vitals have been reviewed.  GEN APPEARANCE: Alert and cooperative, non-toxic appearing and in NAD  HEAD: Atraumatic, normocephalic.   EYES: PERRL.   EARS: Gross hearing intact.   NOSE: No nasal discharge.   THROAT: MMM. Oral cavity and pharynx normal. Uvula midline. No swelling. No exudate.   MOUTH: No gumline tenderness or swelling.   NECK: Supple, no lymphadenopathy  CV: RRR, S1S2, no c/r/m/g. No cyanosis or pallor. Extremities warm, well perfused. Cap refill <2 seconds. No bruits.   LUNGS: CTAB. No wheezing. No rales. No rhonchi. No diminished breath sounds.   ABDOMEN: Soft, NTND. No guarding or rebound.   MSK/EXT: Spine appears normal, no spine point tenderness.   NEURO: Alert, follows commands. Speech normal. Sensation and motor normal x4 extremities.   SKIN: Normal color for race, warm, dry and intact. No evidence of rash.  59 y/o male with PMHx of HTN who presented to the ED after being called back for + blood cultures.   Concern for bacteremia  Sent to OBS for repeat blood cultures, IV antibiotics, and ID f/u

## 2025-05-09 NOTE — ED CDU PROVIDER SUBSEQUENT DAY NOTE - ATTENDING APP SHARED VISIT CONTRIBUTION OF CARE
Seen and examined, have discussed plan of care with PA.   I agree with note as stated above, having amended the EMR as needed to reflect the findings.
Pt was seen and evaluated by me. Pt is a 61 y/o male with PMHx of HTN who presented to the ED after being called back for + blood cultures. Pt initially presented to the ED for headache, nausea, fatigue, and abd pain. At that time cultures where done and pt was called back as they had positive blood culture for coccibacilli. Pt notes symptoms have resolved and was sent to OBS to monitor for repeat cultures and ID f/u. Pt denies any fever, chills, nausea, vomiting, SOB, chest pain, or abd pain. Pt did notice having an area of swelling to roof of mouth that came to a head and then drained and has had no symptoms since.   VITALS: Vitals have been reviewed.  GEN APPEARANCE: Alert and cooperative, non-toxic appearing and in NAD  HEAD: Atraumatic, normocephalic.   EYES: PERRL.   EARS: Gross hearing intact.   NOSE: No nasal discharge.   THROAT: MMM. Oral cavity and pharynx normal. Uvula midline. No swelling. No exudate.   MOUTH: No gumline tenderness or swelling.   NECK: Supple, no lymphadenopathy  CV: RRR, S1S2, no c/r/m/g. No cyanosis or pallor. Extremities warm, well perfused. Cap refill <2 seconds. No bruits.   LUNGS: CTAB. No wheezing. No rales. No rhonchi. No diminished breath sounds.   ABDOMEN: Soft, NTND. No guarding or rebound.   MSK/EXT: Spine appears normal, no spine point tenderness.   NEURO: Alert, follows commands. Speech normal. Sensation and motor normal x4 extremities.   SKIN: Normal color for race, warm, dry and intact. No evidence of rash.  61 y/o male with PMHx of HTN who presented to the ED after being called back for + blood cultures.   Concern for bacteremia  Sent to OBS for repeat blood cultures, IV antibiotics, and ID f/u

## 2025-05-09 NOTE — ED CDU PROVIDER DISPOSITION NOTE - CARE PROVIDER_API CALL
Clarisa Goodrich  Infectious Disease  [address] [place] [id number]  Phone: [phone]  Fax: [phone]  Follow Up Time:

## 2025-05-09 NOTE — ED CDU PROVIDER SUBSEQUENT DAY NOTE - PHYSICAL EXAMINATION
CONSTITUTIONAL:  Well appearing, awake, alert, oriented to person, place, time/situation and in no apparent distress.  Pt. is objectively comfortable appearing and verbalizing in full, clear, effortless sentences.  ENMT: NC/AT.  Airway patent.  Nasal mucosa clear.  Moist mucous membranes.  Neck supple.  EYES:  Clear OU.  CARDIAC:  Normal rate, regular rhythm.  Heart sounds S1 S2.  No murmurs, gallops, or rubs.  RESPIRATORY:  Breath sounds clear and equal bilaterally.  No wheezes, no rales, no rhonchi.  GASTROINTESTINAL:  Abdomen soft, non-distended, non-tender.  No rebound, no guarding.  NEUROLOGICAL:  Alert and oriented to person/place/time/situation.  No focal deficits; no tremors noted.   MUSCULOSKELETAL:  Range of motion is not limited.      SKIN:  Skin color unremarkable.  Skin warm, dry.  PSYCHIATRIC:  Alert and oriented to person/place/time/situation.  Mood and affect WNL.  No apparent risk to self or others.
CONSTITUTIONAL:  Well appearing, awake, alert, oriented to person, place, time/situation and in no apparent distress.  Pt. is objectively comfortable appearing and verbalizing in full, clear, effortless sentences.  ENMT: NC/AT.  Airway patent.  Nasal mucosa clear.  Moist mucous membranes.  Neck supple.  EYES:  Clear OU.  CARDIAC:  Normal rate, regular rhythm.  Heart sounds S1 S2.  No murmurs, gallops, or rubs.  RESPIRATORY:  Breath sounds clear and equal bilaterally.  No wheezes, no rales, no rhonchi.  GASTROINTESTINAL:  Abdomen soft, non-distended, non-tender.  No rebound, no guarding.  NEUROLOGICAL:  Alert and oriented to person/place/time/situation.  No focal deficits; no tremors noted.   MUSCULOSKELETAL:  Range of motion is not limited.      SKIN:  Skin color unremarkable.  Skin warm, dry.  PSYCHIATRIC:  Alert and oriented to person/place/time/situation.  Mood and affect WNL.  No apparent risk to self or others.
no

## 2025-05-09 NOTE — PROVIDER CONTACT NOTE (OTHER) - ASSESSMENT
CM notified in regards to d/c planning. Pt requiring Home infusion. Midline placed at bedside. Rx sent to [facility] for approval. Case Accepted for SOC 5/10. Pt is covered 100% - no out of pocket coverage. Pt to be d/c tonight. Pt wife is a RN and will participate in care. Family to for transport home when medically stable for d/c.

## 2025-05-09 NOTE — ED CDU PROVIDER SUBSEQUENT DAY NOTE - HISTORY
61 yo male, PMH HTN, seen in this ED 5/4/25 (had c/o malaise, headache, nausea, abdominal pain) was called back after 1/2 blood culture sets grew Gram negative coccibacilli in anaerobic bottle.  In the ED, pt stated he was feeling better than when he presented 4 days prior, but did admit he did have night sweats. Lab analysis in the ER unremarkable. ED team placed pt in CDU for ID consult/recommendations.  ID consulted with recs appreciated; pt was started on IV cefepime and flagyl.   On 5/8, ID advised switching to Unasyn IV; GI PCR and CT abd/pelvis imaging advised.  GI PCR result: NotDetected (final result).  CT abd/pelvis was performed; per official radiology report: "...IMPRESSION:  Thickened jejunal loops and hyperemia in the left upper abdomen, which can be seen in infectious or inflammatory enteritis.".  Pt has denied diarrhea.  [facility] called late 5/8 evening regarding 2nd set of BCX from 5/4 ED visit:  BCX anaerobic bottle: Gram Negative coccobacilli and Gram Positive Rods.  VSS, pt has been afebrile.  ID attending Dr. Goodrich messaged regarding these interim results; awaiting further ID recs.  In the interim, pt objectively noted to be resting comfortably; pt has been clinically stable; no issues thus far.  AM labs are ordered. 59 yo male, PMH HTN, seen in this ED 5/3/25 (had c/o malaise, headache, nausea, abdominal pain) was called back after 1/2 blood culture sets (5/4 test date of both BCX sets) grew Gram negative coccibacilli in anaerobic bottle.  In the ED on the current visit, pt stated he was feeling better than prior ED visit, but did admit he did have night sweats. Lab analysis in the ER unremarkable. ED team placed pt in CDU for ID consult/recommendations.  ID consulted with recs appreciated; pt was started on IV cefepime and flagyl.   On 5/8, ID advised switching to Unasyn IV; GI PCR and CT abd/pelvis imaging advised.  GI PCR result: NotDetected (final result).  CT abd/pelvis was performed; per official radiology report: "...IMPRESSION:  Thickened jejunal loops and hyperemia in the left upper abdomen, which can be seen in infectious or inflammatory enteritis.".  Pt has denied diarrhea.  [facility] called late 5/8 evening regarding 2nd set of BCX from 5/4 ED visit:  BCX anaerobic bottle: Gram Negative coccobacilli and Gram Positive Rods.  VSS, pt has been afebrile.  ID attending Dr. Goodrich messaged regarding these interim results; awaiting further ID recs.  In the interim, pt objectively noted to be resting comfortably; pt has been clinically stable; no issues thus far.  AM labs are ordered.

## 2025-05-09 NOTE — ED CDU PROVIDER SUBSEQUENT DAY NOTE - NSICDXPASTMEDICALHX_GEN_ALL_CORE_FT
PAST MEDICAL HISTORY:  HTN (hypertension)     Nephrolithiasis     
PAST MEDICAL HISTORY:  HTN (hypertension)     Nephrolithiasis

## 2025-05-12 LAB
CULTURE RESULTS: SIGNIFICANT CHANGE UP
CULTURE RESULTS: SIGNIFICANT CHANGE UP
SPECIMEN SOURCE: SIGNIFICANT CHANGE UP
SPECIMEN SOURCE: SIGNIFICANT CHANGE UP

## 2025-05-13 LAB — CULTURE RESULTS: ABNORMAL

## 2025-05-20 ENCOUNTER — NON-APPOINTMENT (OUTPATIENT)
Age: 60
End: 2025-05-20

## 2025-06-09 NOTE — ED CDU PROVIDER INITIAL DAY NOTE - ATTENDING APP SHARED VISIT CONTRIBUTION OF CARE
I performed a history and physical exam of the patient and discussed their management with the advanced care provider. I reviewed the advanced care provider's note and agree with the documented findings and plan of care. My medical decision making and objective findings are found above.
within functional limits